# Patient Record
Sex: FEMALE | Race: WHITE | NOT HISPANIC OR LATINO | Employment: UNEMPLOYED | URBAN - METROPOLITAN AREA
[De-identification: names, ages, dates, MRNs, and addresses within clinical notes are randomized per-mention and may not be internally consistent; named-entity substitution may affect disease eponyms.]

---

## 2018-02-22 ENCOUNTER — OFFICE VISIT (OUTPATIENT)
Dept: URGENT CARE | Facility: PHYSICIAN GROUP | Age: 23
End: 2018-02-22
Payer: COMMERCIAL

## 2018-02-22 VITALS
SYSTOLIC BLOOD PRESSURE: 134 MMHG | DIASTOLIC BLOOD PRESSURE: 82 MMHG | BODY MASS INDEX: 30.31 KG/M2 | HEART RATE: 122 BPM | RESPIRATION RATE: 15 BRPM | HEIGHT: 68 IN | WEIGHT: 200 LBS | TEMPERATURE: 97.6 F | OXYGEN SATURATION: 95 %

## 2018-02-22 DIAGNOSIS — J01.00 ACUTE NON-RECURRENT MAXILLARY SINUSITIS: ICD-10-CM

## 2018-02-22 PROCEDURE — 99204 OFFICE O/P NEW MOD 45 MIN: CPT | Performed by: FAMILY MEDICINE

## 2018-02-22 RX ORDER — AMOXICILLIN AND CLAVULANATE POTASSIUM 875; 125 MG/1; MG/1
1 TABLET, FILM COATED ORAL 2 TIMES DAILY
Qty: 14 TAB | Refills: 0 | Status: SHIPPED | OUTPATIENT
Start: 2018-02-22 | End: 2018-03-01

## 2018-02-22 RX ORDER — GUAIFENESIN 600 MG/1
600 TABLET, EXTENDED RELEASE ORAL EVERY 12 HOURS
COMMUNITY
End: 2018-05-13

## 2018-02-22 ASSESSMENT — ENCOUNTER SYMPTOMS
CHILLS: 0
SINUS PRESSURE: 1
SORE THROAT: 0
VOMITING: 0
MYALGIAS: 0
NECK PAIN: 0
FEVER: 0
EYE PAIN: 0
SWOLLEN GLANDS: 0
NAUSEA: 0
DIZZINESS: 0
HEADACHES: 1
SHORTNESS OF BREATH: 0

## 2018-02-22 NOTE — PATIENT INSTRUCTIONS

## 2018-02-23 NOTE — PROGRESS NOTES
"  Subjective:     Fozia Nichole is a 22 y.o. female who presents for Nasal Congestion (x 4 weeks)       Sinusitis   This is a new problem. The current episode started 1 to 4 weeks ago. The problem has been gradually worsening since onset. There has been no fever. The pain is severe. Associated symptoms include congestion, headaches and sinus pressure. Pertinent negatives include no chills, neck pain, shortness of breath, sore throat or swollen glands.     Past Medical History:   Diagnosis Date   • Migraine headache    • Ulcer (CMS-HCC)      Past Surgical History:   Procedure Laterality Date   • TYMPANOPLASTY       Social History     Social History   • Marital status: Single     Spouse name: N/A   • Number of children: N/A   • Years of education: N/A     Occupational History   • Not on file.     Social History Main Topics   • Smoking status: Never Smoker   • Smokeless tobacco: Never Used   • Alcohol use Yes      Comment: socially    • Drug use: No   • Sexual activity: Not on file     Other Topics Concern   • Not on file     Social History Narrative   • No narrative on file      Family History   Problem Relation Age of Onset   • Cancer Mother    • Cancer Maternal Grandmother    • Cancer Maternal Grandfather    • Heart Disease Maternal Grandfather    • Stroke Neg Hx     Review of Systems   Constitutional: Negative for chills and fever.   HENT: Positive for congestion and sinus pressure. Negative for sore throat.    Eyes: Negative for pain.   Respiratory: Negative for shortness of breath.    Cardiovascular: Negative for chest pain.   Gastrointestinal: Negative for nausea and vomiting.   Genitourinary: Negative for hematuria.   Musculoskeletal: Negative for myalgias and neck pain.   Skin: Negative for rash.   Neurological: Positive for headaches. Negative for dizziness.   No Known Allergies   Objective:   /82   Pulse (!) 122   Temp 36.4 °C (97.6 °F)   Resp 15   Ht 1.727 m (5' 8\")   Wt 90.7 kg (200 lb) "   SpO2 95%   Breastfeeding? No   BMI 30.41 kg/m²   Physical Exam   Constitutional: She is oriented to person, place, and time. She appears well-developed and well-nourished. No distress.   HENT:   Head: Normocephalic and atraumatic.   Nose: Mucosal edema and rhinorrhea present. Right sinus exhibits maxillary sinus tenderness. Left sinus exhibits maxillary sinus tenderness.   Eyes: Conjunctivae and EOM are normal. Pupils are equal, round, and reactive to light.   Cardiovascular: Normal rate, regular rhythm, normal heart sounds and intact distal pulses.    No murmur heard.  Pulmonary/Chest: Effort normal and breath sounds normal. No respiratory distress.   Abdominal: Soft. Bowel sounds are normal. She exhibits no distension. There is no tenderness.   Musculoskeletal: Normal range of motion.   Neurological: She is alert and oriented to person, place, and time. She has normal reflexes. No sensory deficit.   Skin: Skin is warm and dry.   Psychiatric: She has a normal mood and affect. Her behavior is normal.         Assessment/Plan:   Assessment    1. Acute non-recurrent maxillary sinusitis  - amoxicillin-clavulanate (AUGMENTIN) 875-125 MG Tab; Take 1 Tab by mouth 2 times a day for 7 days.  Dispense: 14 Tab; Refill: 0  Differential diagnosis, natural history, supportive care, and indications for immediate follow-up discussed.

## 2018-04-10 ENCOUNTER — OFFICE VISIT (OUTPATIENT)
Dept: URGENT CARE | Facility: PHYSICIAN GROUP | Age: 23
End: 2018-04-10
Payer: COMMERCIAL

## 2018-04-10 VITALS
TEMPERATURE: 97.1 F | DIASTOLIC BLOOD PRESSURE: 78 MMHG | OXYGEN SATURATION: 96 % | HEIGHT: 68 IN | HEART RATE: 87 BPM | WEIGHT: 200 LBS | BODY MASS INDEX: 30.31 KG/M2 | SYSTOLIC BLOOD PRESSURE: 110 MMHG

## 2018-04-10 DIAGNOSIS — J06.9 UPPER RESPIRATORY TRACT INFECTION, UNSPECIFIED TYPE: ICD-10-CM

## 2018-04-10 DIAGNOSIS — H66.001 ACUTE SUPPURATIVE OTITIS MEDIA OF RIGHT EAR WITHOUT SPONTANEOUS RUPTURE OF TYMPANIC MEMBRANE, RECURRENCE NOT SPECIFIED: ICD-10-CM

## 2018-04-10 PROCEDURE — 99214 OFFICE O/P EST MOD 30 MIN: CPT | Performed by: PHYSICIAN ASSISTANT

## 2018-04-10 RX ORDER — AMOXICILLIN 875 MG/1
875 TABLET, COATED ORAL 2 TIMES DAILY
Qty: 14 TAB | Refills: 0 | Status: SHIPPED | OUTPATIENT
Start: 2018-04-10 | End: 2018-04-17

## 2018-04-10 ASSESSMENT — ENCOUNTER SYMPTOMS
VOMITING: 0
ABDOMINAL PAIN: 0
DIZZINESS: 0
FEVER: 0
SPUTUM PRODUCTION: 1
CHILLS: 0
EYE DISCHARGE: 0
NECK PAIN: 0
DIARRHEA: 0
SHORTNESS OF BREATH: 0
MYALGIAS: 0
SWOLLEN GLANDS: 0
HEADACHES: 0
SORE THROAT: 1
WHEEZING: 1
TINGLING: 0
RHINORRHEA: 1
EYE REDNESS: 0
COUGH: 1

## 2018-04-10 NOTE — PROGRESS NOTES
"Subjective:      Fozia Nichole is a 22 y.o. female who presents with Otalgia (x2days R ear pain)            Patient is a 22-year-old female who presents with right ear pain, congestion, pressure for the last 2-3 days. Patient reports prior history of T and reconstruction after perforation. She also reports associated postnasal drainage, slight cough with intermittent wheezing. Patient does report prior history of asthma which she is needed to use her rescue inhaler the last few days with significant improvement of wheezing and chest tightness. She denies any fevers, chills, shortness of breath or chest pain.      URI    This is a new problem. The current episode started in the past 7 days. The problem has been waxing and waning. There has been no fever. Associated symptoms include congestion, coughing, ear pain, a plugged ear sensation, rhinorrhea, a sore throat and wheezing. Pertinent negatives include no abdominal pain, chest pain, diarrhea, dysuria, headaches, neck pain, rash, swollen glands or vomiting. Treatments tried: albuterol, antihistamine. The treatment provided moderate relief.       Review of Systems   Constitutional: Negative for chills, fever and malaise/fatigue.   HENT: Positive for congestion, ear pain, rhinorrhea and sore throat. Negative for ear discharge.    Eyes: Negative for discharge and redness.   Respiratory: Positive for cough, sputum production and wheezing. Negative for shortness of breath.    Cardiovascular: Negative for chest pain and leg swelling.   Gastrointestinal: Negative for abdominal pain, diarrhea and vomiting.   Genitourinary: Negative for dysuria and urgency.   Musculoskeletal: Negative for myalgias and neck pain.   Skin: Negative for itching and rash.   Neurological: Negative for dizziness, tingling and headaches.          Objective:     /78   Pulse 87   Temp 36.2 °C (97.1 °F)   Ht 1.727 m (5' 8\")   Wt 90.7 kg (200 lb)   SpO2 96%   BMI 30.41 kg/m²    PMH: "  has a past medical history of Migraine headache and Ulcer (CMS-HCC). She also has no past medical history of Asthma; Cancer (CMS-HCC); Diabetes (CMS-HCC); Hyperlipidemia; or Hypertension.  MEDS:   Current Outpatient Prescriptions:   •  amoxicillin (AMOXIL) 875 MG tablet, Take 1 Tab by mouth 2 times a day for 7 days., Disp: 14 Tab, Rfl: 0  •  Zinc Sulfate (ZINC-220 PO), Take  by mouth., Disp: , Rfl:   •  guaiFENesin LA (MUCINEX) 600 MG TABLET SR 12 HR, Take 600 mg by mouth every 12 hours., Disp: , Rfl:   •  Norgestim-Eth Estrad Triphasic 0.18/0.215/0.25 MG-35 MCG TABS, TAKE 1 TABLET BY MOUTH EVERY DAY, Disp: 84 Tab, Rfl: 3  •  Diclofenac Potassium (CAMBIA) 50 MG PACK, Take 50 mg by mouth as needed. Take one packet and mix with 3oz of water.  Drink on empty stomach at onset of headache. Do not exceed more than one dose in 24 hours., Disp: 9 Each, Rfl: 2  •  topiramate (TOPAMAX) 100 MG TABS, Take 100mg po qhs., Disp: 30 Tab, Rfl: 5  •  amoxicillin-clavulanate (AUGMENTIN) 875-125 MG TABS, TAKE 1 TABLET BY MOUTH TWICE A DAY, Disp: , Rfl: 0  •  CIPRODEX 0.3-0.1 % SUSP, , Disp: , Rfl: 0  •  fluocinonide (LIDEX) 0.05 % OINT, , Disp: , Rfl: 0  •  fluticasone (FLONASE) 50 MCG/ACT nasal spray, INSTILL 2 SPRAYS INTO EACH NOSTRIL ONCE DAILY, Disp: , Rfl: 1  •  montelukast (SINGULAIR) 10 MG TABS, , Disp: , Rfl: 2  •  topiramate (TOPAMAX) 25 MG TABS, Take 100mg po qhs.  Titrate per separate written instructions., Disp: 120 Tab, Rfl: 1  •  ondansetron (ZOFRAN) 8 MG TABS, Take 1 Tab by mouth every 8 hours as needed for Nausea/Vomiting., Disp: 15 Tab, Rfl: 1  •  rizatriptan (MAXALT-MLT) 10 MG disintegrating tablet, Take 1/2-1 tablet po at onset of headache, may repeat dose in 2 hours if unrelieved.  Do not exceed more than 2 tablets in 24 hours., Disp: 9 Tab, Rfl: 2  •  Topiramate ER (TROKENDI XR) 100 MG CP24, Take 100 mg by mouth every bedtime., Disp: 30 Cap, Rfl: 2  •  Topiramate ER (QUDEXY XR) 100 MG CS24, Take 100 mg by mouth  every bedtime. Generic, Disp: 30 Each, Rfl: 2  •  Topiramate  MG CP24, Take 100 mg by mouth every bedtime., Disp: 30 Cap, Rfl: 2  •  zolmitriptan (ZOMIG) 5 MG tablet, Take 1/2-1 tablet po at onset of headache, may repeat dose in 2 hours if unrelieved.  Do not exceed more than 2 tablets in 24 hours., Disp: 18 Tab, Rfl: 1  •  topiramate (TOPOMAX) 15 MG CPSP, Take four capsules po qhs.  Titrate per separate written instructions., Disp: 120 Cap, Rfl: 2  •  zolmitriptan (ZOMIG-ZMT) 2.5 MG disintegrating tablet, Take 1/2-1 tablet po at onset of headache, may repeat dose in 2 hours if unrelieved.  Do not exceed more than 2 tablets in 24 hours., Disp: 9 Tab, Rfl: 3  •  ascorbic acid (ASCORBIC ACID) 500 MG TABS, Take 500 mg by mouth every day., Disp: , Rfl:   •  sumatriptan (IMITREX) 100 MG tablet, Take 1/2-1 tablet po at onset of headache, may repeat dose in 2 hours if unrelieved.  Do not exceed more than 2 tablets in 24 hours., Disp: 9 Tab, Rfl: 5  •  Frovatriptan Succinate 2.5 MG TABS, Take 1/2-1 tablet po at onset of headache, may repeat dose in 2 hours if unrelieved.  Do not exceed more than 2 tablets in 24 hours., Disp: 9 Each, Rfl: 5  •  Non Formulary Request, Birth control pill one tablet daily, Disp: , Rfl:   ALLERGIES: No Known Allergies  SURGHX:   Past Surgical History:   Procedure Laterality Date   • TYMPANOPLASTY       SOCHX:  reports that she has never smoked. She has never used smokeless tobacco. She reports that she drinks alcohol. She reports that she does not use drugs.  FH: Family history was reviewed, no pertinent findings to report    Physical Exam   Constitutional: She is oriented to person, place, and time. She appears well-developed and well-nourished.   HENT:   Head: Normocephalic and atraumatic.   Mouth/Throat: No oropharyngeal exudate.   Ears- Canals clear-Right TM with erythema, bulging middle ear effusion- with purulent discharge- a postsurgical changes to the TM noted. Pos. PND, with  slight erythema- without tonsillar edema or exudate.   Mild discharge noted bilaterally- to nares.      Eyes: EOM are normal. Pupils are equal, round, and reactive to light.   Neck: Normal range of motion. Neck supple.   Cardiovascular: Normal rate and regular rhythm.    No murmur heard.  Pulmonary/Chest: Effort normal and breath sounds normal. No respiratory distress.   Musculoskeletal: Normal range of motion. She exhibits no tenderness.   Lymphadenopathy:     She has no cervical adenopathy.   Neurological: She is alert and oriented to person, place, and time.   Skin: Skin is warm. No rash noted.   Psychiatric: She has a normal mood and affect. Her behavior is normal.   Vitals reviewed.              Assessment/Plan:     1. Acute suppurative otitis media of right ear without spontaneous rupture of tympanic membrane, recurrence not specified  - amoxicillin (AMOXIL) 875 MG tablet; Take 1 Tab by mouth 2 times a day for 7 days.  Dispense: 14 Tab; Refill: 0    2. Upper respiratory tract infection, unspecified type     Encouraged OTC supportive meds PRN. Humidification, increase fluids, avoid night time dairy. No wheezing or adventitious breath sounds noted on exam-patient encouraged to utilize albuterol if needed.  Patient given precautionary s/sx that mandate immediate follow up and evaluation in the ED. Advised of risks of not doing so.    DDX, Supportive care, and indications for immediate follow-up discussed with patient.    Instructed to return to clinic or nearest emergency department if we are not available for any change in condition, further concerns, or worsening of symptoms.    The patient demonstrated a good understanding and agreed with the treatment plan.

## 2018-05-13 ENCOUNTER — OFFICE VISIT (OUTPATIENT)
Dept: URGENT CARE | Facility: PHYSICIAN GROUP | Age: 23
End: 2018-05-13
Payer: COMMERCIAL

## 2018-05-13 ENCOUNTER — HOSPITAL ENCOUNTER (OUTPATIENT)
Facility: MEDICAL CENTER | Age: 23
End: 2018-05-13
Attending: NURSE PRACTITIONER
Payer: COMMERCIAL

## 2018-05-13 VITALS
HEART RATE: 118 BPM | HEIGHT: 68 IN | DIASTOLIC BLOOD PRESSURE: 86 MMHG | SYSTOLIC BLOOD PRESSURE: 128 MMHG | RESPIRATION RATE: 14 BRPM | TEMPERATURE: 96.9 F | OXYGEN SATURATION: 96 % | WEIGHT: 207 LBS | BODY MASS INDEX: 31.37 KG/M2

## 2018-05-13 DIAGNOSIS — N94.9 FEMALE GENITAL LESION: ICD-10-CM

## 2018-05-13 DIAGNOSIS — N89.8 VAGINAL ITCHING: ICD-10-CM

## 2018-05-13 LAB
APPEARANCE UR: NORMAL
BILIRUB UR STRIP-MCNC: NORMAL MG/DL
COLOR UR AUTO: YELLOW
GLUCOSE UR STRIP.AUTO-MCNC: NORMAL MG/DL
INT CON NEG: NEGATIVE
INT CON POS: POSITIVE
KETONES UR STRIP.AUTO-MCNC: NORMAL MG/DL
LEUKOCYTE ESTERASE UR QL STRIP.AUTO: NORMAL
NITRITE UR QL STRIP.AUTO: NORMAL
PH UR STRIP.AUTO: 6 [PH] (ref 5–8)
POC URINE PREGNANCY TEST: NORMAL
PROT UR QL STRIP: NORMAL MG/DL
RBC UR QL AUTO: NORMAL
SP GR UR STRIP.AUTO: 1.02
UROBILINOGEN UR STRIP-MCNC: NORMAL MG/DL

## 2018-05-13 PROCEDURE — 81002 URINALYSIS NONAUTO W/O SCOPE: CPT | Performed by: NURSE PRACTITIONER

## 2018-05-13 PROCEDURE — 81025 URINE PREGNANCY TEST: CPT | Performed by: NURSE PRACTITIONER

## 2018-05-13 PROCEDURE — 87529 HSV DNA AMP PROBE: CPT

## 2018-05-13 PROCEDURE — 99214 OFFICE O/P EST MOD 30 MIN: CPT | Performed by: NURSE PRACTITIONER

## 2018-05-13 RX ORDER — FLUCONAZOLE 150 MG/1
150 TABLET ORAL DAILY
Qty: 1 TAB | Refills: 0 | Status: SHIPPED | OUTPATIENT
Start: 2018-05-13 | End: 2019-01-22

## 2018-05-13 RX ORDER — ACYCLOVIR 400 MG/1
400 TABLET ORAL 3 TIMES DAILY
Qty: 21 TAB | Refills: 0 | Status: SHIPPED | OUTPATIENT
Start: 2018-05-13 | End: 2018-05-20

## 2018-05-13 ASSESSMENT — ENCOUNTER SYMPTOMS
GASTROINTESTINAL NEGATIVE: 1
NEUROLOGICAL NEGATIVE: 1
CONSTITUTIONAL NEGATIVE: 1
MUSCULOSKELETAL NEGATIVE: 1
NAUSEA: 0
PSYCHIATRIC NEGATIVE: 1
FLANK PAIN: 0
CHILLS: 0
RESPIRATORY NEGATIVE: 1
FEVER: 0
CARDIOVASCULAR NEGATIVE: 1
VOMITING: 0
WEAKNESS: 0
ABDOMINAL PAIN: 0

## 2018-05-13 NOTE — PROGRESS NOTES
Subjective:      Fozia Nichole is a 22 y.o. female who presents with Dysuria (x1wk, frequency, painful)        HPI    The patient presents to urgent care today with complaints of vaginal pain, discharge, dysuria, vaginal lesions. States her symptoms started one week ago with dysuria. She subsequently started developing itching 4 days later with an increase in vaginal discharge. States that those symptoms are similar and consistent with history of yeast infections. She used an over-the-counter yeast infection medication with some improvement of her symptoms. She became concerned when she started noticing lesions to her right labia yesterday. In addition, she reports that she is having diarrhea today and unfortunately cannot give us a clean urine sample without diarrhea and her urine sample. She has a history of urinary tract infections but states that this does not feel like one at all. She denies associated fever, chills, urinary urgency, urinary frequency, hematuria, flank pain, abdominal pain, nausea, vomiting. She is sexually active, is monogamous with her wife. The wife admits to a history of oral herpes with a recent feeling of potential outbreak but denies developing lesions. The patient denies known history of STDs.    Past Medical History:   Diagnosis Date   • Migraine headache    • Ulcer         Past Surgical History:   Procedure Laterality Date   • TYMPANOPLASTY       Current Outpatient Prescriptions on File Prior to Visit   Medication Sig Dispense Refill   • Topiramate ER (TROKENDI XR) 100 MG CP24 Take 100 mg by mouth every bedtime. 30 Cap 2   • Topiramate ER (QUDEXY XR) 100 MG CS24 Take 100 mg by mouth every bedtime. Generic 30 Each 2   • Topiramate  MG CP24 Take 100 mg by mouth every bedtime. 30 Cap 2   • zolmitriptan (ZOMIG) 5 MG tablet Take 1/2-1 tablet po at onset of headache, may repeat dose in 2 hours if unrelieved.  Do not exceed more than 2 tablets in 24 hours. 18 Tab 1   •  "zolmitriptan (ZOMIG-ZMT) 2.5 MG disintegrating tablet Take 1/2-1 tablet po at onset of headache, may repeat dose in 2 hours if unrelieved.  Do not exceed more than 2 tablets in 24 hours. 9 Tab 3   • sumatriptan (IMITREX) 100 MG tablet Take 1/2-1 tablet po at onset of headache, may repeat dose in 2 hours if unrelieved.  Do not exceed more than 2 tablets in 24 hours. 9 Tab 5   • Frovatriptan Succinate 2.5 MG TABS Take 1/2-1 tablet po at onset of headache, may repeat dose in 2 hours if unrelieved.  Do not exceed more than 2 tablets in 24 hours. 9 Each 5     No current facility-administered medications on file prior to visit.      Patient has no known allergies.    Review of Systems   Constitutional: Negative.  Negative for chills, fever and malaise/fatigue.   HENT: Negative.    Respiratory: Negative.    Cardiovascular: Negative.    Gastrointestinal: Negative.  Negative for abdominal pain, nausea and vomiting.   Genitourinary: Positive for dysuria. Negative for flank pain, frequency, hematuria and urgency.   Musculoskeletal: Negative.    Skin: Positive for itching and rash.        Painful lesions to right labia   Neurological: Negative.  Negative for weakness.   Endo/Heme/Allergies: Negative.    Psychiatric/Behavioral: Negative.           Objective:     /86   Pulse (!) 118   Temp 36.1 °C (96.9 °F)   Resp 14   Ht 1.727 m (5' 8\")   Wt 93.9 kg (207 lb)   LMP 03/13/2018   SpO2 96%   BMI 31.47 kg/m²      Physical Exam   Constitutional: She is oriented to person, place, and time. Vital signs are normal. She appears well-developed and well-nourished. She is active. She does not have a sickly appearance. She does not appear ill. No distress.   HENT:   Head: Normocephalic and atraumatic.   Right Ear: External ear normal.   Left Ear: External ear normal.   Nose: Nose normal.   Mouth/Throat: Oropharynx is clear and moist. No oropharyngeal exudate.   Eyes: Conjunctivae are normal. Pupils are equal, round, and reactive " to light. Right eye exhibits no discharge. Left eye exhibits no discharge. No scleral icterus.   Neck: Normal range of motion. Neck supple. No JVD present.   Cardiovascular: Normal rate, regular rhythm, normal heart sounds and intact distal pulses.    Normal rate auscultated at 95.   Pulmonary/Chest: Effort normal and breath sounds normal. No stridor. No respiratory distress. She has no wheezes.   Abdominal: Soft. Normal appearance and bowel sounds are normal. She exhibits no distension. There is no tenderness. There is no rebound and no guarding.   Genitourinary:       Pelvic exam was performed with patient supine. There is rash, tenderness and lesion on the right labia. There is no injury on the right labia.   Musculoskeletal: Normal range of motion. She exhibits no edema, tenderness or deformity.   Lymphadenopathy:     She has no cervical adenopathy.   Neurological: She is alert and oriented to person, place, and time. She has normal strength. No cranial nerve deficit or sensory deficit. She exhibits normal muscle tone. Coordination and gait normal. GCS eye subscore is 4. GCS verbal subscore is 5. GCS motor subscore is 6.   Skin: Skin is warm. Capillary refill takes less than 2 seconds. Rash noted. No abrasion, no bruising, no ecchymosis and no laceration noted. Rash is vesicular. She is not diaphoretic. No pallor.   Psychiatric: She has a normal mood and affect. Her behavior is normal. Judgment and thought content normal.   Vitals reviewed.              Assessment/Plan:     1. Vaginal itching  POCT Urinalysis    POCT Pregnancy    fluconazole (DIFLUCAN) 150 MG tablet   2. Female genital lesion  HERPES SCREEN VIRAL CULTURE    acyclovir (ZOVIRAX) 400 MG tablet           Lab Results   Component Value Date/Time    POCCOLOR yellow 05/13/2018 03:32 PM    POCAPPEAR cloudy 05/13/2018 03:32 PM    POCLEUKEST LG 05/13/2018 03:32 PM    POCNITRITE neg 05/13/2018 03:32 PM    POCUROBILIGE neg 05/13/2018 03:32 PM    POCPROTEIN  neg 05/13/2018 03:32 PM    POCURPH 6.0 05/13/2018 03:32 PM    POCBLOOD SM 05/13/2018 03:32 PM    POCSPGRV 1.020 05/13/2018 03:32 PM    POCKETONES neg 05/13/2018 03:32 PM    POCBILIRUBIN neg 05/13/2018 03:32 PM    POCGLUCUA neg 05/13/2018 03:32 PM      POC preg negative      Patient is a well-appearing 22-year-old female with complaints of vaginal pain, dysuria, itching, and lesions. Her urine dip today shows large leuks in small blood. She reports that her urine sample was provided with scant amount of diarrhea and her urine, therefore I do not feel the test is solid. She denies urinary tract infection symptoms do not suspect this has urinary etiology. Her pregnancy is negative. Due to her symptoms and improvement with over-the-counter antifungal, will provide one dose of Diflucan. In addition, a herpes screen was collected and will be sent for testing. She will be given acyclovir for suspected herpetic lesions, will call patient with results. Safe sex practices discussed. Supportive care, differential diagnoses, and indications for immediate follow-up discussed with patient.   Pathogenesis of diagnosis discussed including typical length and natural progression.   Instructed to return to clinic or nearest emergency department for any change in condition, further concerns, or worsening of symptoms.  Patient states understanding of the plan of care and discharge instructions.  Instructed to make an appointment, for follow up, with her primary care provider.        BLANCA West.

## 2018-05-14 LAB
HSV1+2 DNA SPEC QL NAA+PROBE: ABNORMAL
SIGNIFICANT IND 70042: ABNORMAL
SITE SITE: ABNORMAL
SOURCE SOURCE: ABNORMAL

## 2018-05-15 ENCOUNTER — TELEPHONE (OUTPATIENT)
Dept: URGENT CARE | Facility: PHYSICIAN GROUP | Age: 23
End: 2018-05-15

## 2018-05-15 NOTE — TELEPHONE ENCOUNTER
The patient was called for re-evaluation, HSV positive for type I, a message was left to call back to discuss, instructed to continue antivirals, encouraged to call back to the clinic or return to clinic with any questions or concerns.

## 2018-05-20 ENCOUNTER — OFFICE VISIT (OUTPATIENT)
Dept: URGENT CARE | Facility: PHYSICIAN GROUP | Age: 23
End: 2018-05-20
Payer: COMMERCIAL

## 2018-05-20 ENCOUNTER — HOSPITAL ENCOUNTER (OUTPATIENT)
Facility: MEDICAL CENTER | Age: 23
End: 2018-05-20
Attending: NURSE PRACTITIONER
Payer: COMMERCIAL

## 2018-05-20 VITALS
SYSTOLIC BLOOD PRESSURE: 126 MMHG | DIASTOLIC BLOOD PRESSURE: 82 MMHG | HEIGHT: 68 IN | OXYGEN SATURATION: 97 % | BODY MASS INDEX: 31.37 KG/M2 | WEIGHT: 207 LBS | RESPIRATION RATE: 16 BRPM | HEART RATE: 94 BPM | TEMPERATURE: 98.7 F

## 2018-05-20 DIAGNOSIS — N30.01 ACUTE CYSTITIS WITH HEMATURIA: ICD-10-CM

## 2018-05-20 DIAGNOSIS — R30.0 DYSURIA: ICD-10-CM

## 2018-05-20 LAB
APPEARANCE UR: NORMAL
BILIRUB UR STRIP-MCNC: NORMAL MG/DL
COLOR UR AUTO: YELLOW
GLUCOSE UR STRIP.AUTO-MCNC: NORMAL MG/DL
KETONES UR STRIP.AUTO-MCNC: NORMAL MG/DL
LEUKOCYTE ESTERASE UR QL STRIP.AUTO: NORMAL
NITRITE UR QL STRIP.AUTO: NORMAL
PH UR STRIP.AUTO: 6.5 [PH] (ref 5–8)
PROT UR QL STRIP: NORMAL MG/DL
RBC UR QL AUTO: NORMAL
SP GR UR STRIP.AUTO: 1.02
UROBILINOGEN UR STRIP-MCNC: NORMAL MG/DL

## 2018-05-20 PROCEDURE — 99214 OFFICE O/P EST MOD 30 MIN: CPT | Performed by: NURSE PRACTITIONER

## 2018-05-20 PROCEDURE — 81002 URINALYSIS NONAUTO W/O SCOPE: CPT | Performed by: NURSE PRACTITIONER

## 2018-05-20 PROCEDURE — 87086 URINE CULTURE/COLONY COUNT: CPT

## 2018-05-20 RX ORDER — NITROFURANTOIN 25; 75 MG/1; MG/1
100 CAPSULE ORAL EVERY 12 HOURS
Qty: 10 CAP | Refills: 0 | Status: SHIPPED | OUTPATIENT
Start: 2018-05-20 | End: 2018-05-25

## 2018-05-20 NOTE — PROGRESS NOTES
Chief Complaint   Patient presents with   • Dysuria     onset friday, urgency, frequency       HISTORY OF PRESENT ILLNESS: Patient is a 23 y.o. female who presents today due to three days of urinary burning, urgency, and frequency. Reports some associated pelvic pressure. Denies hematuria, fever, flank pain, N/V. Denies a history of pyelonephritis or kidney stones. Admits to a history of frequent UTIs, last one was in November. She was recently seen for genital lesion and discharge, diagnosed with HSV and was prescribed antivirals. She reports significant improvement and denies any GYN complaints at this time.     Patient Active Problem List    Diagnosis Date Noted   • Migraine without aura 01/21/2014       Allergies:Patient has no known allergies.    Current Outpatient Prescriptions Ordered in [facility]   Medication Sig Dispense Refill   • nitrofurantoin monohydr macro (MACROBID) 100 MG Cap Take 1 Cap by mouth every 12 hours for 5 days. 10 Cap 0   • acyclovir (ZOVIRAX) 400 MG tablet Take 1 Tab by mouth 3 times a day for 7 days. 21 Tab 0   • fluconazole (DIFLUCAN) 150 MG tablet Take 1 Tab by mouth every day. 1 Tab 0   • Topiramate ER (TROKENDI XR) 100 MG CP24 Take 100 mg by mouth every bedtime. 30 Cap 2   • Topiramate ER (QUDEXY XR) 100 MG CS24 Take 100 mg by mouth every bedtime. Generic 30 Each 2   • Topiramate  MG CP24 Take 100 mg by mouth every bedtime. 30 Cap 2   • zolmitriptan (ZOMIG) 5 MG tablet Take 1/2-1 tablet po at onset of headache, may repeat dose in 2 hours if unrelieved.  Do not exceed more than 2 tablets in 24 hours. 18 Tab 1   • zolmitriptan (ZOMIG-ZMT) 2.5 MG disintegrating tablet Take 1/2-1 tablet po at onset of headache, may repeat dose in 2 hours if unrelieved.  Do not exceed more than 2 tablets in 24 hours. 9 Tab 3   • sumatriptan (IMITREX) 100 MG tablet Take 1/2-1 tablet po at onset of headache, may repeat dose in 2 hours if unrelieved.  Do not exceed more than 2 tablets in 24 hours. 9 Tab 5  "  • Frovatriptan Succinate 2.5 MG TABS Take 1/2-1 tablet po at onset of headache, may repeat dose in 2 hours if unrelieved.  Do not exceed more than 2 tablets in 24 hours. 9 Each 5     No current Epic-ordered facility-administered medications on file.        Past Medical History:   Diagnosis Date   • Migraine headache    • Ulcer        Social History   Substance Use Topics   • Smoking status: Never Smoker   • Smokeless tobacco: Never Used   • Alcohol use Yes      Comment: socially        Family Status   Relation Status   • Mother    • Maternal Grandmother    • Maternal Grandfather    • Neg Hx      Family History   Problem Relation Age of Onset   • Cancer Mother    • Cancer Maternal Grandmother    • Cancer Maternal Grandfather    • Heart Disease Maternal Grandfather    • Stroke Neg Hx        ROS:  Review of Systems   Constitutional: Negative for fever, chills, weight loss and malaise/fatigue.   HENT: Negative for ear pain, nosebleeds, congestion, sore throat and neck pain.    Eyes: Negative for vision changes.   Neuro: Negative for headache, sensory changes, weakness, seizure, LOC.   Cardiovascular: Negative for chest pain, palpitations, orthopnea and leg swelling.   Respiratory: Negative for cough, sputum production, shortness of breath and wheezing.   Gastrointestinal: Positive for lower abdominal pressure. Negative for abdominal pain, nausea, vomiting or diarrhea.   Genitourinary: Positive for dysuria, urgency and frequency. Negative for hematuria or flank pain.   Skin: Negative for rash, diaphoresis.     Exam:  Blood pressure 126/82, pulse 94, temperature 37.1 °C (98.7 °F), resp. rate 16, height 1.727 m (5' 8\"), weight 93.9 kg (207 lb), SpO2 97 %.  General: well-nourished, well-developed female in NAD  Head: normocephalic, atraumatic  Eyes: PERRLA, no conjunctival injection, acuity grossly intact, lids normal.  Lymph: no cervical adenopathy. No supraclavicular adenopathy.   Neuro: alert and oriented. Cranial " nerves 1-12 grossly intact. No sensory deficit.   Cardiovascular: regular rate and rhythm. No edema.  Pulmonary: no distress. Chest is symmetrical with respiration, no wheezes, crackles, or rhonchi.   Abdomen: soft, non-tender, no guarding, no hepatosplenomegaly. No CVA tenderness.   Musculoskeletal: no clubbing, appropriate muscle tone, gait is stable.  Skin: warm, dry, intact, no clubbing, no cyanosis, no rashes.   Psych: appropriate mood, affect, judgement.         Assessment/Plan:  1. Acute cystitis with hematuria  Urine Culture    nitrofurantoin monohydr macro (MACROBID) 100 MG Cap   2. Dysuria  POCT Urinalysis    Urine Culture    nitrofurantoin monohydr macro (MACROBID) 100 MG Cap    CANCELED: POCT Pregnancy           Antibiotic as directed. Increase fluid intake. Urine sent for culture.   Supportive care, differential diagnoses, and indications for immediate follow-up discussed with patient.   Pathogenesis of diagnosis discussed including typical length and natural progression.   Instructed to return to clinic or nearest emergency department for any change in condition, further concerns, or worsening of symptoms.  Patient states understanding of the plan of care and discharge instructions.  Instructed to make an appointment, for follow up, with their primary care provider.        Please note that this dictation was created using voice recognition software. I have made every reasonable attempt to correct obvious errors, but I expect that there are errors of grammar and possibly content that I did not discover before finalizing the note.      BLANCA [place].

## 2018-05-22 ENCOUNTER — TELEPHONE (OUTPATIENT)
Dept: URGENT CARE | Facility: PHYSICIAN GROUP | Age: 23
End: 2018-05-22

## 2018-05-22 DIAGNOSIS — B96.89 BV (BACTERIAL VAGINOSIS): ICD-10-CM

## 2018-05-22 DIAGNOSIS — N76.0 BV (BACTERIAL VAGINOSIS): ICD-10-CM

## 2018-05-22 LAB
BACTERIA UR CULT: ABNORMAL
BACTERIA UR CULT: ABNORMAL
SIGNIFICANT IND 70042: ABNORMAL
SITE SITE: ABNORMAL
SOURCE SOURCE: ABNORMAL

## 2018-05-22 RX ORDER — METRONIDAZOLE 500 MG/1
500 TABLET ORAL 2 TIMES DAILY
Qty: 14 TAB | Refills: 0 | Status: SHIPPED | OUTPATIENT
Start: 2018-05-22 | End: 2018-05-29

## 2018-05-22 NOTE — TELEPHONE ENCOUNTER
The patient was called for re-evaluation, urine culture positive for gardnerella, suspect vaginal etiology. Instructed to DC macrobid and start Flagyl instead. Refrain from sexual activity until her wife is treated to prevent reoccurrence. Encouraged to call back to the clinic or return to clinic with any questions or concerns.

## 2018-08-14 ENCOUNTER — GYNECOLOGY VISIT (OUTPATIENT)
Dept: OBGYN | Facility: MEDICAL CENTER | Age: 23
End: 2018-08-14
Payer: COMMERCIAL

## 2018-08-14 ENCOUNTER — HOSPITAL ENCOUNTER (OUTPATIENT)
Facility: MEDICAL CENTER | Age: 23
End: 2018-08-14
Attending: OBSTETRICS & GYNECOLOGY
Payer: COMMERCIAL

## 2018-08-14 VITALS — DIASTOLIC BLOOD PRESSURE: 72 MMHG | BODY MASS INDEX: 31.63 KG/M2 | SYSTOLIC BLOOD PRESSURE: 112 MMHG | WEIGHT: 208 LBS

## 2018-08-14 DIAGNOSIS — Z01.419 WELL WOMAN EXAM: ICD-10-CM

## 2018-08-14 DIAGNOSIS — Z12.4 SCREENING FOR CERVICAL CANCER: ICD-10-CM

## 2018-08-14 DIAGNOSIS — Z11.51 SPECIAL SCREENING EXAMINATION FOR HUMAN PAPILLOMAVIRUS (HPV): ICD-10-CM

## 2018-08-14 PROCEDURE — 99395 PREV VISIT EST AGE 18-39: CPT | Performed by: OBSTETRICS & GYNECOLOGY

## 2018-08-14 PROCEDURE — 87591 N.GONORRHOEAE DNA AMP PROB: CPT

## 2018-08-14 PROCEDURE — 87491 CHLMYD TRACH DNA AMP PROBE: CPT

## 2018-08-14 PROCEDURE — 88175 CYTOPATH C/V AUTO FLUID REDO: CPT

## 2018-08-14 RX ORDER — NORGESTIMATE AND ETHINYL ESTRADIOL 7DAYSX3 LO
1 KIT ORAL DAILY
Qty: 28 TAB | Refills: 12 | Status: SHIPPED | OUTPATIENT
Start: 2018-08-14 | End: 2019-01-22

## 2018-08-14 NOTE — PROGRESS NOTES
Fozia Nichole is a 23 y.o. No obstetric history on file. female who presents for her Annual Gynecologic Exam      HPI Comments: Pt presents for her annual well woman exam.   Pt has no complaints. Currently in a same sex relationship.  Would like to restart OCPs to help with acne  Patient's last menstrual period was 07/02/2018.    Review of Systems:   Pertinent positives documented in HPI and all other systems reviewed & are negative    PGYN Hx: H/O HSV    All PMH, PSH, allergies, social history and FH reviewed and updated today:  Past Medical History:   Diagnosis Date   • Migraine headache    • Ulcer        Past Surgical History:   Procedure Laterality Date   • TYMPANOPLASTY         Medications:   Current Outpatient Prescriptions Ordered in [facility]   Medication Sig Dispense Refill   • Norgestim-Eth Estrad Triphasic (ORTHO TRI-CYCLEN LO) 0.18/0.215/0.25 MG-25 MCG Tab Take 1 Tab by mouth every day. 28 Tab 12   • fluconazole (DIFLUCAN) 150 MG tablet Take 1 Tab by mouth every day. 1 Tab 0   • Topiramate ER (TROKENDI XR) 100 MG CP24 Take 100 mg by mouth every bedtime. 30 Cap 2   • Topiramate ER (QUDEXY XR) 100 MG CS24 Take 100 mg by mouth every bedtime. Generic 30 Each 2   • Topiramate  MG CP24 Take 100 mg by mouth every bedtime. 30 Cap 2   • zolmitriptan (ZOMIG) 5 MG tablet Take 1/2-1 tablet po at onset of headache, may repeat dose in 2 hours if unrelieved.  Do not exceed more than 2 tablets in 24 hours. 18 Tab 1   • zolmitriptan (ZOMIG-ZMT) 2.5 MG disintegrating tablet Take 1/2-1 tablet po at onset of headache, may repeat dose in 2 hours if unrelieved.  Do not exceed more than 2 tablets in 24 hours. 9 Tab 3   • sumatriptan (IMITREX) 100 MG tablet Take 1/2-1 tablet po at onset of headache, may repeat dose in 2 hours if unrelieved.  Do not exceed more than 2 tablets in 24 hours. 9 Tab 5   • Frovatriptan Succinate 2.5 MG TABS Take 1/2-1 tablet po at onset of headache, may repeat dose in 2 hours if unrelieved.   Do not exceed more than 2 tablets in 24 hours. 9 Each 5     No current Epic-ordered facility-administered medications on file.        Patient has no known allergies.    Social History     Social History   • Marital status: Single     Spouse name: N/A   • Number of children: N/A   • Years of education: N/A     Social History Main Topics   • Smoking status: Never Smoker   • Smokeless tobacco: Never Used   • Alcohol use Yes      Comment: socially    • Drug use: No   • Sexual activity: Not on file     Other Topics Concern   • Not on file     Social History Narrative   • No narrative on file       Family History   Problem Relation Age of Onset   • Cancer Mother    • Cancer Maternal Grandmother    • Cancer Maternal Grandfather    • Heart Disease Maternal Grandfather    • Stroke Neg Hx           Objective:   Vital measurements:  Blood pressure 112/72, weight 94.3 kg (208 lb), last menstrual period 07/02/2018, not currently breastfeeding.  Body mass index is 31.63 kg/m². (Goal BM I>18 <25)    Physical Exam   Nursing note and vitals reviewed.  Constitutional: She is oriented to person, place, and time. She appears well-developed and well-nourished. No distress.     HEENT:   Head: Normocephalic and atraumatic.   Right Ear: External ear normal.   Left Ear: External ear normal.   Nose: Nose normal.   Eyes: Conjunctivae and EOM are normal. Pupils are equal, round, and reactive to light. No scleral icterus.     Neck: Normal range of motion. Neck supple. No tracheal deviation present. No thyromegaly present. No cervical or supraclavicular lymphadenopathy.    Pulmonary/Chest: Effort normal and breath sounds normal. No respiratory distress. She has no wheezes. She has no rales. She exhibits no tenderness.     Cardiovascular: Regular, rate and rhythm. No edema.    Breast: Symmetrical, normal consistency without masses., No dimpling or skin changes, Normal nipples without discharge, no axillary lymphadenopathy    Abdominal: Soft.  Bowel sounds are normal. She exhibits no distension and no mass. No tenderness. She has no rebound and no guarding.     Genitourinary:  Pelvic exam was performed with patient supine.  External genitalia with no abnormal pigmentation, labial fusion, rashes, tenderness, lesions or injury to the labia bilaterally.  BUS normal  Vagina is pink and moist with no lesions, foul discharge, erythema, tenderness or bleeding. No foreign body around the vagina or signs of injury.   Cervix exhibits no motion tenderness, no discharge and no friability, no lesions.   Uterus is 8 cm not deviated, not enlarged, not fixed and not tender.  Right adnexa displays no mass, no tenderness and no fullness.  Left adnexa displays no mass, no tenderness and no fullness.     Musculoskeletal: Normal range of motion. Non tender. She exhibits no edema and no tenderness.     Lymphadenopathy: She has no cervical or supraclavicular adenopathy.     Neurological: She is alert and oriented to person, place, and time. She exhibits normal muscle tone.     Skin: Skin is warm and dry. No rash noted. She is not diaphoretic. No erythema. No pallor.     Psychiatric: She has a normal mood and affect. Her behavior is normal. Judgment and thought content normal.        Assessment:     1. Well woman exam  THINPREP RFLX HPV ASCUS W/CTNG   2. Screening for cervical cancer  THINPREP RFLX HPV ASCUS W/CTNG   3. Special screening examination for human papillomavirus (HPV)  THINPREP RFLX HPV ASCUS W/CTNG         Plan:   Pap and physical exam performed  Self breast awareness discussed.  Encouraged exercise and proper diet.  Mammograms starting @ age 40 annually.  See medications and orders placed in encounter report.  Follow up in 1 year for annual exam or sooner as needed    Today I discussed with the patient risks, benefits, alternatives to birth control pills. We discussed estrogen progesterone-containing pills. I discussed normal use of pills, I.e. take the pill on a  daily basis, preferably at the same time each day. Also I discussed side effects of oral contraceptives  which can include nausea, vomiting, irregular bleeding, breast tenderness. We discussed risks associated with birth control pills mainly risk of blood clots in the legs that could travel to the lungs.  We discussed effectiveness of pills in preventing pregnancy to be between 95 and 98%.  Also discussed proper use of pills and what to do should the patient skip any pills. Discussed using backup form of birth control for the first month of use and with any missed pills.  Pt understands these risks and desires to take OCPs. Rx sent    Pt denies any aura with migraines    Also d/w pt Gardasil series, will decide and let us know

## 2018-08-15 DIAGNOSIS — Z01.419 WELL WOMAN EXAM: ICD-10-CM

## 2018-08-15 DIAGNOSIS — Z12.4 SCREENING FOR CERVICAL CANCER: ICD-10-CM

## 2018-08-15 DIAGNOSIS — Z11.51 SPECIAL SCREENING EXAMINATION FOR HUMAN PAPILLOMAVIRUS (HPV): ICD-10-CM

## 2018-08-16 LAB
C TRACH DNA GENITAL QL NAA+PROBE: NEGATIVE
CYTOLOGY REG CYTOL: NORMAL
N GONORRHOEA DNA GENITAL QL NAA+PROBE: NEGATIVE
SPECIMEN SOURCE: NORMAL

## 2018-12-21 ENCOUNTER — TELEPHONE (OUTPATIENT)
Dept: SCHEDULING | Facility: IMAGING CENTER | Age: 23
End: 2018-12-21

## 2019-01-22 ENCOUNTER — OFFICE VISIT (OUTPATIENT)
Dept: MEDICAL GROUP | Facility: PHYSICIAN GROUP | Age: 24
End: 2019-01-22
Payer: COMMERCIAL

## 2019-01-22 VITALS
WEIGHT: 201 LBS | OXYGEN SATURATION: 97 % | HEIGHT: 68 IN | HEART RATE: 107 BPM | RESPIRATION RATE: 16 BRPM | BODY MASS INDEX: 30.46 KG/M2 | SYSTOLIC BLOOD PRESSURE: 110 MMHG | DIASTOLIC BLOOD PRESSURE: 70 MMHG | TEMPERATURE: 97.8 F

## 2019-01-22 DIAGNOSIS — L70.0 ACNE VULGARIS: ICD-10-CM

## 2019-01-22 PROCEDURE — 99214 OFFICE O/P EST MOD 30 MIN: CPT | Performed by: PHYSICIAN ASSISTANT

## 2019-01-22 RX ORDER — NORGESTIMATE AND ETHINYL ESTRADIOL 7DAYSX3 LO
KIT ORAL
COMMUNITY
End: 2019-09-23 | Stop reason: SDUPTHER

## 2019-01-22 RX ORDER — CLINDAMYCIN PHOSPHATE 10 MG/G
GEL TOPICAL
Qty: 1 TUBE | Refills: 0 | Status: CANCELLED | OUTPATIENT
Start: 2019-01-22

## 2019-01-22 RX ORDER — CLINDAMYCIN PHOSPHATE 10 MG/G
GEL TOPICAL
Qty: 1 TUBE | Refills: 2 | Status: SHIPPED | OUTPATIENT
Start: 2019-01-22 | End: 2019-07-12

## 2019-01-22 RX ORDER — FLUTICASONE PROPIONATE 50 MCG
1 SPRAY, SUSPENSION (ML) NASAL
Refills: 4 | COMMUNITY
Start: 2018-11-09 | End: 2019-01-22

## 2019-01-22 ASSESSMENT — PATIENT HEALTH QUESTIONNAIRE - PHQ9: CLINICAL INTERPRETATION OF PHQ2 SCORE: 0

## 2019-01-22 NOTE — PROGRESS NOTES
Chief Complaint   Patient presents with   • Establish Care     pt would like to discuss change in BC due to achne issues       HISTORY OF PRESENT ILLNESS: Fozia Nichole is an established 23 y.o. female here to discuss the evaluation and management of:    Patient is a pleasant 23-year-old female here today to discuss acne vulgaris.  She tells me that she has suffered from acne for several years.  States she is currently taking Tri-Sprintec oral contraceptive for acne.  She tells me that her menstrual cycles are regular and menses is 5 days in duration with mild bleeding.  Admits to menstrual cramping and states menstrual cramping varies in severity.  States the first day of her menstrual period she experiences severe cramping that tapers.  States she takes over-the-counter Midol with alleviation of symptoms.  Denies having a regular exercise routine.  Denies personal or family history of DVT/PEs.  States oral contraceptive is slowly for acne reasons.  Patient is concerned because she still has cystic acne.  States facial regimen is washing face every a.m. and p.m. with Neutrogena stubborn acne and using tamiko moisturizer after washing face.  Patient is inquiring about treatment options.      Patient Active Problem List    Diagnosis Date Noted   • Acne vulgaris 01/22/2019   • Migraine without aura 01/21/2014       Allergies:Patient has no known allergies.    Current Outpatient Prescriptions   Medication Sig Dispense Refill   • Norgestim-Eth Estrad Triphasic 0.18/0.215/0.25 MG-25 MCG Tab Take  by mouth.     • clindamycin (CLEOCIN T) 1 % Gel Apply every a.m. and p.m. to affected areas after washing face. 1 Tube 2     No current facility-administered medications for this visit.        Social History   Substance Use Topics   • Smoking status: Never Smoker   • Smokeless tobacco: Never Used   • Alcohol use Yes      Comment: socially        Family Status   Relation Status   • Mo Alive   • MGMo Alive   • MGFa Alive  "  • Sis Alive   • Neg Hx (Not Specified)     Family History   Problem Relation Age of Onset   • Breast Cancer Mother 52   • Cancer Maternal Grandmother         Ovarian CA    • Heart Disease Maternal Grandfather    • Thyroid Sister         Hyperthyroid    • Stroke Neg Hx        ROS:  Review of Systems   Constitutional: Negative for fever, chills, weight loss and malaise/fatigue.   HENT: Negative for ear pain, nosebleeds, congestion, sore throat and neck pain.    Eyes: Negative for blurred vision.   Respiratory: Negative for cough, sputum production, shortness of breath and wheezing.    Cardiovascular: Negative for chest pain, palpitations, orthopnea and leg swelling.   Gastrointestinal: Negative for heartburn, nausea, vomiting and abdominal pain.   Genitourinary: Negative for dysuria, urgency and frequency.   Musculoskeletal: Negative for myalgias, back pain and joint pain.   Skin: Negative for rash and itching.  Positive for facial acne.  Neurological: Negative for dizziness, tingling, tremors, sensory change, focal weakness and headaches.   Endo/Heme/Allergies: Does not bruise/bleed easily.   Psychiatric/Behavioral: Negative for depression, suicidal ideas and memory loss.  The patient is not nervous/anxious and does not have insomnia.    All other systems reviewed and are negative except as in HPI.    Exam: Blood pressure 110/70, pulse (!) 107, temperature 36.6 °C (97.8 °F), resp. rate 16, height 1.727 m (5' 8\"), weight 91.2 kg (201 lb), SpO2 97 %, not currently breastfeeding. Body mass index is 30.56 kg/m².  General: Normal appearing. No distress.  HEENT: Normocephalic. Eyes conjunctiva clear lids without ptosis, pupils equal and reactive to light accommodation, ears normal shape and contour, canals are clear bilaterally, tympanic membranes are benign, nasal mucosa benign, oropharynx is without erythema, edema or exudates.   Neck: Supple without JVD or bruit. Thyroid is not enlarged.  Pulmonary: Clear to " ausculation.  Normal effort. No rales, ronchi, or wheezing.  Cardiovascular: Regular rate and rhythm without murmur.   Abdomen: Soft, nontender, nondistended. Normal bowel sounds. Liver and spleen are not palpable  Neurologic: Grossly nonfocal.  Cranial nerves are normal.   Lymph: No cervical, supraclavicular or axillary lymph nodes are palpable  Skin: Warm and dry.  No rashes or suspicious skin lesions.  Positive for facial acne on forehead and bilateral cheeks.  Musculoskeletal: Normal gait. No extremity cyanosis, clubbing, or edema.  Psych: Normal mood and affect. Alert and oriented x3. Judgment and insight is normal.    Medical decision-making and discussion:  1. Acne vulgaris  Continue current oral contraceptive method.  Patient has been prescribed clindamycin 1% gel and advised to use over-the-counter benzoyl peroxide 5% in combination with clindamycin gel and advised to apply to affected areas every a.m. and p.m. after washing her face.  If symptoms improve she can decrease it from twice a day to once a day.  By patient if she does not noticed improvement of her symptoms to contact me.  Will consider deferring patient to dermatology.    Discussed the importance of good facial hygiene.    Follow-up for worsening symptoms,lack of expected recovery, or should new symptoms or problems arise.      - clindamycin (CLEOCIN T) 1 % Gel; Apply every a.m. and p.m. to affected areas after washing face.  Dispense: 1 Tube; Refill: 2      Please note that this dictation was created using voice recognition software. I have made every reasonable attempt to correct obvious errors, but I expect that there are errors of grammar and possibly content that I did not discover before finalizing the note.        Return if symptoms worsen or fail to improve.

## 2019-01-24 ENCOUNTER — OFFICE VISIT (OUTPATIENT)
Dept: URGENT CARE | Facility: CLINIC | Age: 24
End: 2019-01-24
Payer: COMMERCIAL

## 2019-01-24 VITALS
OXYGEN SATURATION: 97 % | TEMPERATURE: 97.6 F | HEART RATE: 111 BPM | DIASTOLIC BLOOD PRESSURE: 80 MMHG | SYSTOLIC BLOOD PRESSURE: 110 MMHG | BODY MASS INDEX: 30.92 KG/M2 | HEIGHT: 68 IN | WEIGHT: 204 LBS | RESPIRATION RATE: 14 BRPM

## 2019-01-24 DIAGNOSIS — R09.82 PND (POST-NASAL DRIP): ICD-10-CM

## 2019-01-24 DIAGNOSIS — J06.9 UPPER RESPIRATORY TRACT INFECTION, UNSPECIFIED TYPE: ICD-10-CM

## 2019-01-24 DIAGNOSIS — R09.81 SINUS CONGESTION: ICD-10-CM

## 2019-01-24 PROCEDURE — 99214 OFFICE O/P EST MOD 30 MIN: CPT | Performed by: PHYSICIAN ASSISTANT

## 2019-01-24 RX ORDER — AMOXICILLIN AND CLAVULANATE POTASSIUM 875; 125 MG/1; MG/1
1 TABLET, FILM COATED ORAL 2 TIMES DAILY
Qty: 20 TAB | Refills: 0 | Status: SHIPPED | OUTPATIENT
Start: 2019-01-24 | End: 2019-07-12

## 2019-01-24 RX ORDER — FLUTICASONE PROPIONATE 50 MCG
2 SPRAY, SUSPENSION (ML) NASAL DAILY
Qty: 16 G | Refills: 0 | Status: SHIPPED | OUTPATIENT
Start: 2019-01-24 | End: 2021-01-12

## 2019-01-24 ASSESSMENT — ENCOUNTER SYMPTOMS
SPUTUM PRODUCTION: 0
ABDOMINAL PAIN: 0
CHILLS: 0
VOMITING: 0
SHORTNESS OF BREATH: 0
NAUSEA: 0
WHEEZING: 0
SINUS PAIN: 0
DIARRHEA: 0
SORE THROAT: 1
FEVER: 0
COUGH: 0

## 2019-01-24 NOTE — PROGRESS NOTES
"Subjective:   Fozia Nichole is a 23 y.o. female who presents for Nasal Congestion (runny nose x3 days)        Sinus Problem   This is a new problem. The current episode started in the past 7 days. Associated symptoms include congestion and a sore throat. Pertinent negatives include no chills, coughing, ear pain or shortness of breath.     Notes last 3d of sinus press and congestion, denies ear pain now - resolved, c/o ST at night, denies cough, denies nasuea/voimting/abdpain/diarrhea/rash, PMH of sinusitis, remote PMH of asthma, denies wheeze, denies PMH of bronchitis, denies PMH of pneumonia, PMH of seasonal allerg. Tried using mucinex.     Review of Systems   Constitutional: Negative for chills and fever.   HENT: Positive for congestion and sore throat. Negative for ear pain and sinus pain.    Respiratory: Negative for cough, sputum production, shortness of breath and wheezing.    Gastrointestinal: Negative for abdominal pain, diarrhea, nausea and vomiting.   Skin: Negative for rash.   Endo/Heme/Allergies: Positive for environmental allergies.     No Known Allergies   Objective:   /80 (BP Location: Left arm, Patient Position: Sitting, BP Cuff Size: Adult)   Pulse (!) 111   Temp 36.4 °C (97.6 °F)   Resp 14   Ht 1.727 m (5' 8\")   Wt 92.5 kg (204 lb)   SpO2 97%   BMI 31.02 kg/m²   Physical Exam   Constitutional: She is oriented to person, place, and time. She appears well-developed and well-nourished. No distress.   HENT:   Head: Normocephalic and atraumatic.   Right Ear: Tympanic membrane, external ear and ear canal normal. Tympanic membrane is not scarred.   Left Ear: External ear and ear canal normal. Tympanic membrane is scarred.   Nose: Nose normal. Right sinus exhibits no maxillary sinus tenderness and no frontal sinus tenderness. Left sinus exhibits no maxillary sinus tenderness and no frontal sinus tenderness.   Mouth/Throat: Uvula is midline and mucous membranes are normal. Posterior " oropharyngeal erythema ( mild PND) present. No oropharyngeal exudate, posterior oropharyngeal edema or tonsillar abscesses.   Eyes: Conjunctivae and lids are normal. Right eye exhibits no discharge. Left eye exhibits no discharge. No scleral icterus.   Neck: Neck supple.   Pulmonary/Chest: Effort normal. No respiratory distress. She has no decreased breath sounds. She has no wheezes. She has no rhonchi. She has no rales.   Musculoskeletal: Normal range of motion.   Lymphadenopathy:     She has cervical adenopathy ( mild bilat).   Neurological: She is alert and oriented to person, place, and time. She is not disoriented.   Skin: Skin is warm and dry. She is not diaphoretic. No erythema. No pallor.   Psychiatric: Her speech is normal and behavior is normal.   Nursing note and vitals reviewed.        Assessment/Plan:   1. Upper respiratory tract infection, unspecified type  - amoxicillin-clavulanate (AUGMENTIN) 875-125 MG Tab; Take 1 Tab by mouth 2 times a day.  Dispense: 20 Tab; Refill: 0    2. Sinus congestion  - amoxicillin-clavulanate (AUGMENTIN) 875-125 MG Tab; Take 1 Tab by mouth 2 times a day.  Dispense: 20 Tab; Refill: 0    3. PND (post-nasal drip)  - fluticasone (FLONASE) 50 MCG/ACT nasal spray; Spray 2 Sprays in nose every day.  Dispense: 16 g; Refill: 0  Supportive care is reviewed with patient/caregiver - recommend to push PO fluids and electrolytes, Nsaids/tylenol, netti pot/saline irrig, humidifier in home, flonase, ponaris, antihistamine, Contingent antibiotic prescription given to patient to fill upon meeting criteria of guidelines discussed.   If filling,  take full course of Rx, take with probiotics, observe for resolution  Return to clinic with lack of resolution or progression of symptoms.      Differential diagnosis, natural history, supportive care, and indications for immediate follow-up discussed.

## 2019-03-01 ENCOUNTER — TELEPHONE (OUTPATIENT)
Dept: OBGYN | Facility: CLINIC | Age: 24
End: 2019-03-01

## 2019-03-02 NOTE — TELEPHONE ENCOUNTER
Pt LM on VM requesting refill for birth Control, pt last seen 8/2018  Called [facility] pharmacy, spoke with Ashely and called in Rx, pt notified   TRI-LO-SPRINTEC TABLET  1 pack with 6 refills.

## 2019-07-12 ENCOUNTER — OFFICE VISIT (OUTPATIENT)
Dept: URGENT CARE | Facility: CLINIC | Age: 24
End: 2019-07-12
Payer: COMMERCIAL

## 2019-07-12 ENCOUNTER — HOSPITAL ENCOUNTER (OUTPATIENT)
Facility: MEDICAL CENTER | Age: 24
End: 2019-07-12
Attending: PHYSICIAN ASSISTANT
Payer: COMMERCIAL

## 2019-07-12 VITALS
RESPIRATION RATE: 17 BRPM | DIASTOLIC BLOOD PRESSURE: 80 MMHG | WEIGHT: 202 LBS | HEART RATE: 110 BPM | TEMPERATURE: 98.1 F | OXYGEN SATURATION: 97 % | HEIGHT: 69 IN | BODY MASS INDEX: 29.92 KG/M2 | SYSTOLIC BLOOD PRESSURE: 100 MMHG

## 2019-07-12 DIAGNOSIS — N89.8 VAGINAL ODOR: ICD-10-CM

## 2019-07-12 DIAGNOSIS — N76.0 BACTERIAL VAGINOSIS: Primary | ICD-10-CM

## 2019-07-12 DIAGNOSIS — B96.89 BACTERIAL VAGINOSIS: Primary | ICD-10-CM

## 2019-07-12 LAB
APPEARANCE UR: NORMAL
BILIRUB UR STRIP-MCNC: NEGATIVE MG/DL
CANDIDA DNA VAG QL PROBE+SIG AMP: NEGATIVE
COLOR UR AUTO: NORMAL
G VAGINALIS DNA VAG QL PROBE+SIG AMP: POSITIVE
GLUCOSE UR STRIP.AUTO-MCNC: NEGATIVE MG/DL
KETONES UR STRIP.AUTO-MCNC: NORMAL MG/DL
LEUKOCYTE ESTERASE UR QL STRIP.AUTO: NORMAL
NITRITE UR QL STRIP.AUTO: NEGATIVE
PH UR STRIP.AUTO: 5.5 [PH] (ref 5–8)
PROT UR QL STRIP: NEGATIVE MG/DL
RBC UR QL AUTO: NEGATIVE
SP GR UR STRIP.AUTO: 1.02
T VAGINALIS DNA VAG QL PROBE+SIG AMP: NEGATIVE
UROBILINOGEN UR STRIP-MCNC: 0.2 MG/DL

## 2019-07-12 PROCEDURE — 87480 CANDIDA DNA DIR PROBE: CPT

## 2019-07-12 PROCEDURE — 87510 GARDNER VAG DNA DIR PROBE: CPT

## 2019-07-12 PROCEDURE — 81002 URINALYSIS NONAUTO W/O SCOPE: CPT | Performed by: PHYSICIAN ASSISTANT

## 2019-07-12 PROCEDURE — 87660 TRICHOMONAS VAGIN DIR PROBE: CPT

## 2019-07-12 PROCEDURE — 99214 OFFICE O/P EST MOD 30 MIN: CPT | Performed by: PHYSICIAN ASSISTANT

## 2019-07-12 RX ORDER — METRONIDAZOLE 500 MG/1
500 TABLET ORAL 2 TIMES DAILY
Qty: 14 TAB | Refills: 0 | Status: SHIPPED | OUTPATIENT
Start: 2019-07-12 | End: 2019-07-19

## 2019-07-12 ASSESSMENT — ENCOUNTER SYMPTOMS
CONSTIPATION: 0
ANOREXIA: 0
DIARRHEA: 0
VOMITING: 0
VAGINITIS: 1
SHORTNESS OF BREATH: 0
FLANK PAIN: 0
NAUSEA: 0
CHILLS: 0
FEVER: 0
ABDOMINAL PAIN: 0
COUGH: 0

## 2019-07-12 NOTE — PATIENT INSTRUCTIONS
Bacterial Vaginosis  Bacterial vaginosis is an infection of the vagina. It happens when too many germs (bacteria) grow in the vagina. This infection puts you at risk for infections from sex (STIs). Treating this infection can lower your risk for some STIs. You should also treat this if you are pregnant. It can cause your baby to be born early.  Follow these instructions at home:  Medicines  · Take over-the-counter and prescription medicines only as told by your doctor.  · Take or use your antibiotic medicine as told by your doctor. Do not stop taking or using it even if you start to feel better.  General instructions  · If you your sexual partner is a woman, tell her that you have this infection. She needs to get treatment if she has symptoms. If you have a male partner, he does not need to be treated.  · During treatment:  ¨ Avoid sex.  ¨ Do not douche.  ¨ Avoid alcohol as told.  ¨ Avoid breastfeeding as told.  · Drink enough fluid to keep your pee (urine) clear or pale yellow.  · Keep your vagina and butt (rectum) clean.  ¨ Wash the area with warm water every day.  ¨ Wipe from front to back after you use the toilet.  · Keep all follow-up visits as told by your doctor. This is important.  Preventing this condition  · Do not douche.  · Use only warm water to wash around your vagina.  · Use protection when you have sex. This includes:  ¨ Latex condoms.  ¨ Dental dams.  · Limit how many people you have sex with. It is best to only have sex with the same person (be monogamous).  · Get tested for STIs. Have your partner get tested.  · Wear underwear that is cotton or lined with cotton.  · Avoid tight pants and pantyhose. This is most important in summer.  · Do not use any products that have nicotine or tobacco in them. These include cigarettes and e-cigarettes. If you need help quitting, ask your doctor.  · Do not use illegal drugs.  · Limit how much alcohol you drink.  Contact a doctor if:  · Your symptoms do not get  better, even after you are treated.  · You have more discharge or pain when you pee (urinate).  · You have a fever.  · You have pain in your belly (abdomen).  · You have pain with sex.  · Your bleed from your vagina between periods.  Summary  · This infection happens when too many germs (bacteria) grow in the vagina.  · Treating this condition can lower your risk for some infections from sex (STIs).  · You should also treat this if you are pregnant. It can cause early (premature) birth.  · Do not stop taking or using your antibiotic medicine even if you start to feel better.  This information is not intended to replace advice given to you by your health care provider. Make sure you discuss any questions you have with your health care provider.  Document Released: 09/26/2009 Document Revised: 09/02/2017 Document Reviewed: 09/02/2017  [employer] Interactive Patient Education © 2017 [employer]

## 2019-07-12 NOTE — PROGRESS NOTES
Subjective:   Fozia Nichole is a 24 y.o. female who presents for Vaginal Discharge (vaginal odor, x2wks)        Vaginitis   The patient's primary symptoms include a genital odor. The problem occurs constantly. The problem has been unchanged. She is not pregnant. Pertinent negatives include no abdominal pain, anorexia, chills, constipation, diarrhea, dysuria, fever, flank pain, frequency, hematuria, nausea, urgency or vomiting. She has tried nothing for the symptoms. She is sexually active (Female partner). She uses nothing for contraception. There is no history of PID, an STD or vaginosis.     Recent STI test- neg. Declines repeat testing at this time.     Review of Systems   Constitutional: Negative for chills, fever and malaise/fatigue.   Respiratory: Negative for cough and shortness of breath.    Gastrointestinal: Negative for abdominal pain, anorexia, constipation, diarrhea, nausea and vomiting.   Genitourinary: Negative for dysuria, flank pain, frequency, hematuria and urgency.        Pos Vaginal odor   All other systems reviewed and are negative.      PMH:  has a past medical history of Migraine headache and Ulcer. She also has no past medical history of Asthma; Cancer ([facility]); Diabetes ([facility]); Hyperlipidemia; or Hypertension.  MEDS:   Current Outpatient Prescriptions:   •  metroNIDAZOLE (FLAGYL) 500 MG Tab, Take 1 Tab by mouth 2 Times a Day for 7 days., Disp: 14 Tab, Rfl: 0  •  Norgestim-Eth Estrad Triphasic 0.18/0.215/0.25 MG-25 MCG Tab, Take  by mouth., Disp: , Rfl:   •  fluticasone (FLONASE) 50 MCG/ACT nasal spray, Spray 2 Sprays in nose every day., Disp: 16 g, Rfl: 0  ALLERGIES: No Known Allergies  SURGHX:   Past Surgical History:   Procedure Laterality Date   • TONSILLECTOMY     • TYMPANOPLASTY       SOCHX:  reports that she has never smoked. She has never used smokeless tobacco. She reports that she drinks alcohol. She reports that she does not use drugs.  Family History   Problem Relation Age of  "Onset   • Breast Cancer Mother 52   • Cancer Maternal Grandmother         Ovarian CA    • Heart Disease Maternal Grandfather    • Thyroid Sister         Hyperthyroid    • Stroke Neg Hx         Objective:   /80 (BP Location: Left arm, Patient Position: Sitting, BP Cuff Size: Large adult)   Pulse (!) 110   Temp 36.7 °C (98.1 °F) (Temporal)   Resp 17   Ht 1.753 m (5' 9\")   Wt 91.6 kg (202 lb)   LMP 06/24/2019   SpO2 97%   Breastfeeding? No   BMI 29.83 kg/m²     Physical Exam   Constitutional: She is oriented to person, place, and time. She appears well-developed and well-nourished. No distress.   HENT:   Head: Normocephalic and atraumatic.   Nose: Nose normal.   Eyes: Pupils are equal, round, and reactive to light. Conjunctivae are normal.   Neck: Normal range of motion. Neck supple. No tracheal deviation present.   Cardiovascular: Normal rate and regular rhythm.    Pulmonary/Chest: Effort normal and breath sounds normal.   Abdominal: Soft. Bowel sounds are normal. There is no CVA tenderness.   Genitourinary: Pelvic exam was performed with patient prone. There is no tenderness or lesion on the right labia. There is no tenderness or lesion on the left labia. Cervix exhibits no motion tenderness. No erythema in the vagina. Vaginal discharge (Thin white discharge) found.   Neurological: She is alert and oriented to person, place, and time.   Skin: Skin is warm and dry. Capillary refill takes less than 2 seconds.   Psychiatric: She has a normal mood and affect. Her behavior is normal.   Vitals reviewed.    Lab Results   Component Value Date/Time    POCCOLOR dark yellow 07/12/2019 09:02 AM    POCAPPEAR cloudy 07/12/2019 09:02 AM    POCLEUKEST small 07/12/2019 09:02 AM    POCNITRITE negative 07/12/2019 09:02 AM    POCUROBILIGE 0.2 07/12/2019 09:02 AM    POCPROTEIN negative 07/12/2019 09:02 AM    POCURPH 5.5 07/12/2019 09:02 AM    POCBLOOD negative 07/12/2019 09:02 AM    POCSPGRV 1.025 07/12/2019 09:02 AM    " POCKETONES trace 07/12/2019 09:02 AM    POCBILIRUBIN negative 07/12/2019 09:02 AM    POCGLUCUA negative 07/12/2019 09:02 AM            Assessment/Plan:     1. Bacterial vaginosis  metroNIDAZOLE (FLAGYL) 500 MG Tab    VAGINAL PATHOGENS DNA PANEL    POCT Urinalysis     The patient will be empirically treated with Flagyl.  She will be notified of final culture results.  Educational handout on bacterial vaginosis provided.    Follow-up with primary care provider.  If symptoms worsen or persist patient can return to clinic for reevaluation. All side effects of medication discussed including allergic response, GI upset, tendon injury, etc. Patient verbalized understanding of information.    Please note that this dictation was created using voice recognition software. I have made every reasonable attempt to correct obvious errors, but I expect that there are errors of grammar and possibly content that I did not discover before finalizing the note.

## 2019-07-16 ENCOUNTER — OFFICE VISIT (OUTPATIENT)
Dept: BEHAVIORAL HEALTH | Facility: CLINIC | Age: 24
End: 2019-07-16
Payer: COMMERCIAL

## 2019-07-16 DIAGNOSIS — F43.10 PTSD (POST-TRAUMATIC STRESS DISORDER): ICD-10-CM

## 2019-07-16 PROBLEM — F43.21 ADJUSTMENT DISORDER WITH DEPRESSED MOOD: Status: ACTIVE | Noted: 2019-07-16

## 2019-07-16 PROCEDURE — 90791 PSYCH DIAGNOSTIC EVALUATION: CPT | Performed by: PSYCHOLOGIST
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
## 2019-08-02 ENCOUNTER — OFFICE VISIT (OUTPATIENT)
Dept: URGENT CARE | Facility: CLINIC | Age: 24
End: 2019-08-02
Payer: COMMERCIAL

## 2019-08-02 VITALS
BODY MASS INDEX: 30.31 KG/M2 | RESPIRATION RATE: 14 BRPM | HEIGHT: 68 IN | DIASTOLIC BLOOD PRESSURE: 70 MMHG | SYSTOLIC BLOOD PRESSURE: 124 MMHG | HEART RATE: 98 BPM | WEIGHT: 200 LBS | OXYGEN SATURATION: 100 % | TEMPERATURE: 98.2 F

## 2019-08-02 DIAGNOSIS — R10.13 EPIGASTRIC PAIN: ICD-10-CM

## 2019-08-02 LAB
APPEARANCE UR: CLEAR
BILIRUB UR STRIP-MCNC: NORMAL MG/DL
COLOR UR AUTO: YELLOW
GLUCOSE UR STRIP.AUTO-MCNC: NORMAL MG/DL
INT CON NEG: NEGATIVE
INT CON POS: POSITIVE
KETONES UR STRIP.AUTO-MCNC: NORMAL MG/DL
LEUKOCYTE ESTERASE UR QL STRIP.AUTO: NORMAL
NITRITE UR QL STRIP.AUTO: NORMAL
PH UR STRIP.AUTO: 6 [PH] (ref 5–8)
POC URINE PREGNANCY TEST: NEGATIVE
PROT UR QL STRIP: NORMAL MG/DL
RBC UR QL AUTO: NORMAL
SP GR UR STRIP.AUTO: 1.02
UROBILINOGEN UR STRIP-MCNC: 1 MG/DL

## 2019-08-02 PROCEDURE — 81002 URINALYSIS NONAUTO W/O SCOPE: CPT | Performed by: PHYSICIAN ASSISTANT

## 2019-08-02 PROCEDURE — 99214 OFFICE O/P EST MOD 30 MIN: CPT | Performed by: PHYSICIAN ASSISTANT

## 2019-08-02 PROCEDURE — 81025 URINE PREGNANCY TEST: CPT | Performed by: PHYSICIAN ASSISTANT

## 2019-08-02 RX ORDER — OMEPRAZOLE 20 MG/1
20 CAPSULE, DELAYED RELEASE ORAL DAILY
Qty: 30 CAP | Refills: 0 | Status: SHIPPED | OUTPATIENT
Start: 2019-08-02 | End: 2021-01-12

## 2019-08-03 NOTE — PATIENT INSTRUCTIONS
Food Choices for Gastroesophageal Reflux Disease, Adult  When you have gastroesophageal reflux disease (GERD), the foods you eat and your eating habits are very important. Choosing the right foods can help ease your discomfort.   WHAT GUIDELINES DO I NEED TO FOLLOW?   · Choose fruits, vegetables, whole grains, and low-fat dairy products.    · Choose low-fat meat, fish, and poultry.  · Limit fats such as oils, salad dressings, butter, nuts, and avocado.    · Keep a food diary. This helps you identify foods that cause symptoms.    · Avoid foods that cause symptoms. These may be different for everyone.    · Eat small meals often instead of 3 large meals a day.    · Eat your meals slowly, in a place where you are relaxed.    · Limit fried foods.    · Cook foods using methods other than frying.    · Avoid drinking alcohol.    · Avoid drinking large amounts of liquids with your meals.    · Avoid bending over or lying down until 2-3 hours after eating.    WHAT FOODS ARE NOT RECOMMENDED?   These are some foods and drinks that may make your symptoms worse:  Vegetables  Tomatoes. Tomato juice. Tomato and spaghetti sauce. Chili peppers. Onion and garlic. Horseradish.  Fruits  Oranges, grapefruit, and lemon (fruit and juice).  Meats  High-fat meats, fish, and poultry. This includes hot dogs, ribs, ham, sausage, salami, and sequeira.  Dairy  Whole milk and chocolate milk. Sour cream. Cream. Butter. Ice cream. Cream cheese.   Drinks  Coffee and tea. Bubbly (carbonated) drinks or energy drinks.  Condiments  Hot sauce. Barbecue sauce.   Sweets/Desserts  Chocolate and cocoa. Donuts. Peppermint and spearmint.  Fats and Oils  High-fat foods. This includes French fries and potato chips.  Other  Vinegar. Strong spices. This includes black pepper, white pepper, red pepper, cayenne, mccauley powder, cloves, ginger, and chili powder.  The items listed above may not be a complete list of foods and drinks to avoid. Contact your dietitian for more  information.     This information is not intended to replace advice given to you by your health care provider. Make sure you discuss any questions you have with your health care provider.     Document Released: 06/18/2013 Document Revised: 01/08/2016 Document Reviewed: 10/22/2014  [employer] Interactive Patient Education ©2016 [employer]

## 2019-08-05 ASSESSMENT — ENCOUNTER SYMPTOMS
FLATUS: 0
VOMITING: 0
NAUSEA: 0
CHILLS: 0
DIARRHEA: 0
SHORTNESS OF BREATH: 0
FEVER: 0
FLANK PAIN: 0
CONSTIPATION: 0
DIZZINESS: 0
MUSCULOSKELETAL NEGATIVE: 1
ABDOMINAL PAIN: 1

## 2019-08-05 ASSESSMENT — CROHNS DISEASE ACTIVITY INDEX (CDAI): CDAI SCORE: 0

## 2019-08-05 NOTE — PROGRESS NOTES
"Subjective:      Fozia Nichole is a 24 y.o. female who presents with Abdominal Pain (upper abd pain x 2 weeks)            Abdominal Pain   This is a new problem. The current episode started 1 to 4 weeks ago (2 weeks). The problem occurs constantly. The problem has been gradually worsening. The pain is located in the epigastric region. The pain is at a severity of 4/10. The pain is moderate. The quality of the pain is aching and sharp. The abdominal pain does not radiate. Pertinent negatives include no constipation, diarrhea, dysuria, fever, flatus, frequency, hematuria, nausea or vomiting. The pain is aggravated by eating and palpation. The pain is relieved by certain positions. She has tried nothing for the symptoms. There is no history of abdominal surgery, Crohn's disease, gallstones, GERD, irritable bowel syndrome, pancreatitis, PUD or ulcerative colitis.     Patient presents to urgent care reporting a 2 week history of mild upper abdominal pain, gradually worsening. No history of the same. No history of frequent nsaid use of PUD. She hasn't tried taking any OTC medications for the pain. No fevers, chills, body aches, constipation, diarrhea, nausea, vomiting, urinary symptoms, or bloody stools.     Review of Systems   Constitutional: Negative for chills and fever.   HENT: Negative for congestion.    Respiratory: Negative for shortness of breath.    Cardiovascular: Negative for chest pain.   Gastrointestinal: Positive for abdominal pain. Negative for constipation, diarrhea, flatus, nausea and vomiting.   Genitourinary: Negative.  Negative for dysuria, flank pain, frequency, hematuria and urgency.   Musculoskeletal: Negative.    Skin: Negative for rash.   Neurological: Negative for dizziness.        Objective:     /70 (BP Location: Left arm, Patient Position: Sitting, BP Cuff Size: Adult)   Pulse 98   Temp 36.8 °C (98.2 °F) (Temporal)   Resp 14   Ht 1.727 m (5' 8\")   Wt 90.7 kg (200 lb)   SpO2 " 100%   BMI 30.41 kg/m²      Physical Exam   Constitutional: She is oriented to person, place, and time. She appears well-developed and well-nourished. No distress.   HENT:   Head: Normocephalic and atraumatic.   Eyes: Pupils are equal, round, and reactive to light. Conjunctivae are normal.   Neck: Normal range of motion.   Cardiovascular: Normal rate.   Pulmonary/Chest: Effort normal.   Abdominal: Soft. Normal appearance and bowel sounds are normal. There is tenderness in the epigastric area. There is no rebound, no CVA tenderness, no tenderness at McBurney's point and negative Arzate's sign.       Musculoskeletal: Normal range of motion.   Neurological: She is alert and oriented to person, place, and time.   Skin: Skin is warm and dry. She is not diaphoretic.   Psychiatric: She has a normal mood and affect. Her behavior is normal.   Nursing note and vitals reviewed.         PMH:  has a past medical history of Migraine headache and Ulcer. She also has no past medical history of Asthma, Cancer ([facility]), Diabetes ([facility]), Hyperlipidemia, or Hypertension.  MEDS:   Current Outpatient Medications:   •  omeprazole (PRILOSEC) 20 MG delayed-release capsule, Take 1 Cap by mouth every day., Disp: 30 Cap, Rfl: 0  •  fluticasone (FLONASE) 50 MCG/ACT nasal spray, Spray 2 Sprays in nose every day., Disp: 16 g, Rfl: 0  •  Norgestim-Eth Estrad Triphasic 0.18/0.215/0.25 MG-25 MCG Tab, Take  by mouth., Disp: , Rfl:   ALLERGIES: No Known Allergies  SURGHX:   Past Surgical History:   Procedure Laterality Date   • TONSILLECTOMY     • TYMPANOPLASTY       SOCHX:  reports that she has never smoked. She has never used smokeless tobacco. She reports that she drinks alcohol. She reports that she has current or past drug history. Drug: Marijuana.  FH: family history includes Breast Cancer (age of onset: 52) in her mother; Cancer in her maternal grandmother; Heart Disease in her maternal grandfather; Thyroid in her sister.    POCT Urinalysis:  Ref  Range & Units 3d ago    POC Color Negative yellow    POC Appearance Negative clear    POC Leukocyte Esterase Negative neg    POC Nitrites Negative neg    POC Urobiligen Negative (0.2) mg/dL 1.0    POC Protein Negative mg/dL neg    POC Urine PH 5.0 - 8.0 6.0    POC Blood Negative trace    POC Specific Gravity <1.005 - >1.030 1.020    POC Ketones Negative mg/dL neg    POC Bilirubin Negative mg/dL neg    POC Glucose Negative mg/dL neg           Assessment/Plan:     1. Epigastric pain  - POCT Urinalysis --> nornaml  - POCT Pregnancy --> negative   - CBC WITH DIFFERENTIAL; Future  - Comp Metabolic Panel; Future  - LIPASE; Future  - H. PYLORI BREATH TEST  - omeprazole (PRILOSEC) 20 MG delayed-release capsule; Take 1 Cap by mouth every day.  Dispense: 30 Cap; Refill: 0    GI cocktail given at today's visit with good resolution of symptoms. Blood work given at today's visit to have performed at her convenience. She will start taking prilosec daily after submitting breath test for H pylori. GERD diet discussed. Pending blood work results. The patient demonstrated a good understanding and agreed with the treatment plan.

## 2019-09-16 ENCOUNTER — TELEPHONE (OUTPATIENT)
Dept: OBGYN | Facility: CLINIC | Age: 24
End: 2019-09-16

## 2019-09-18 ENCOUNTER — TELEPHONE (OUTPATIENT)
Dept: OBGYN | Facility: CLINIC | Age: 24
End: 2019-09-18

## 2019-09-18 NOTE — TELEPHONE ENCOUNTER
Pt called and left msg re:BC refill.  Unable to contact pt, msg left to call back.     Per pt msg left to notify that she needs to set up an annual appt to be able to get new Rx.

## 2019-09-23 ENCOUNTER — HOSPITAL ENCOUNTER (OUTPATIENT)
Facility: MEDICAL CENTER | Age: 24
End: 2019-09-23
Attending: FAMILY MEDICINE
Payer: COMMERCIAL

## 2019-09-23 ENCOUNTER — OFFICE VISIT (OUTPATIENT)
Dept: URGENT CARE | Facility: PHYSICIAN GROUP | Age: 24
End: 2019-09-23
Payer: COMMERCIAL

## 2019-09-23 VITALS
HEART RATE: 100 BPM | HEIGHT: 68 IN | WEIGHT: 200 LBS | TEMPERATURE: 98.7 F | DIASTOLIC BLOOD PRESSURE: 70 MMHG | SYSTOLIC BLOOD PRESSURE: 122 MMHG | OXYGEN SATURATION: 97 % | BODY MASS INDEX: 30.31 KG/M2

## 2019-09-23 DIAGNOSIS — N30.00 ACUTE CYSTITIS WITHOUT HEMATURIA: ICD-10-CM

## 2019-09-23 LAB
APPEARANCE UR: CLEAR
BILIRUB UR STRIP-MCNC: NEGATIVE MG/DL
COLOR UR AUTO: YELLOW
GLUCOSE UR STRIP.AUTO-MCNC: NEGATIVE MG/DL
KETONES UR STRIP.AUTO-MCNC: NEGATIVE MG/DL
LEUKOCYTE ESTERASE UR QL STRIP.AUTO: NORMAL
NITRITE UR QL STRIP.AUTO: NEGATIVE
PH UR STRIP.AUTO: 7 [PH] (ref 5–8)
PROT UR QL STRIP: NEGATIVE MG/DL
RBC UR QL AUTO: NEGATIVE
SP GR UR STRIP.AUTO: 1.01
UROBILINOGEN UR STRIP-MCNC: 0.2 MG/DL

## 2019-09-23 PROCEDURE — 99214 OFFICE O/P EST MOD 30 MIN: CPT | Performed by: FAMILY MEDICINE

## 2019-09-23 PROCEDURE — 87510 GARDNER VAG DNA DIR PROBE: CPT

## 2019-09-23 PROCEDURE — 87660 TRICHOMONAS VAGIN DIR PROBE: CPT

## 2019-09-23 PROCEDURE — 87491 CHLMYD TRACH DNA AMP PROBE: CPT

## 2019-09-23 PROCEDURE — 87591 N.GONORRHOEAE DNA AMP PROB: CPT

## 2019-09-23 PROCEDURE — 87480 CANDIDA DNA DIR PROBE: CPT

## 2019-09-23 PROCEDURE — 81002 URINALYSIS NONAUTO W/O SCOPE: CPT | Performed by: FAMILY MEDICINE

## 2019-09-23 RX ORDER — NITROFURANTOIN 25; 75 MG/1; MG/1
100 CAPSULE ORAL 2 TIMES DAILY
Qty: 10 CAP | Refills: 0 | Status: SHIPPED | OUTPATIENT
Start: 2019-09-23 | End: 2019-09-28

## 2019-09-23 RX ORDER — NORGESTIMATE AND ETHINYL ESTRADIOL 7DAYSX3 LO
1 KIT ORAL DAILY
Qty: 90 TAB | Refills: 0 | Status: SHIPPED | OUTPATIENT
Start: 2019-09-23 | End: 2021-01-12

## 2019-09-24 DIAGNOSIS — N76.0 BV (BACTERIAL VAGINOSIS): ICD-10-CM

## 2019-09-24 DIAGNOSIS — B96.89 BV (BACTERIAL VAGINOSIS): ICD-10-CM

## 2019-09-24 LAB
CANDIDA DNA VAG QL PROBE+SIG AMP: NEGATIVE
G VAGINALIS DNA VAG QL PROBE+SIG AMP: POSITIVE
T VAGINALIS DNA VAG QL PROBE+SIG AMP: NEGATIVE

## 2019-09-24 RX ORDER — METRONIDAZOLE 500 MG/1
500 TABLET ORAL EVERY 12 HOURS
Qty: 14 TAB | Refills: 0 | Status: SHIPPED | OUTPATIENT
Start: 2019-09-24 | End: 2019-10-01

## 2019-09-25 LAB
C TRACH DNA SPEC QL NAA+PROBE: NEGATIVE
N GONORRHOEA DNA SPEC QL NAA+PROBE: NEGATIVE
SPECIMEN SOURCE: NORMAL

## 2019-09-28 NOTE — PROGRESS NOTES
Chief Complaint   Patient presents with   • Vaginal Itching, dysuria                            DYSURIA    This is a new problem. The current episode started in the past 4 days. The problem occurs constantly. The problem has been unchanged. Associated symptoms include : vaginal itching, but denies d/c.         Pertinent negatives include no change in bowel habit, chest pain, chills, visual change, vomiting or weakness. Nothing aggravates the symptoms. She has tried nothing for the symptoms.        #2.  Also requests refill on OCP.   Taking daily for several years - denies side effects.     Social History     Socioeconomic History   • Marital status: Single     Spouse name: Not on file   • Number of children: Not on file   • Years of education: Not on file   • Highest education level: Not on file   Occupational History   • Not on file   Social Needs   • Financial resource strain: Not on file   • Food insecurity:     Worry: Not on file     Inability: Not on file   • Transportation needs:     Medical: Not on file     Non-medical: Not on file   Tobacco Use   • Smoking status: Never Smoker   • Smokeless tobacco: Never Used   Substance and Sexual Activity   • Alcohol use: Yes     Comment: socially    • Drug use: Yes     Types: Marijuana   • Sexual activity: Yes     Partners: Female   Lifestyle   • Physical activity:     Days per week: Not on file     Minutes per session: Not on file   • Stress: Not on file   Relationships   • Social connections:     Talks on phone: Not on file     Gets together: Not on file     Attends Voodoo service: Not on file     Active member of club or organization: Not on file     Attends meetings of clubs or organizations: Not on file     Relationship status: Not on file   • Intimate partner violence:     Fear of current or ex partner: Not on file     Emotionally abused: Not on file     Physically abused: Not on file     Forced sexual activity: Not on file   Other Topics Concern   • Not on file  "  Social History Narrative   • Not on file       Past medical history was unremarkable and not pertinent to current issue    Current Outpatient Medications on File Prior to Visit   Medication Sig Dispense Refill   • omeprazole (PRILOSEC) 20 MG delayed-release capsule Take 1 Cap by mouth every day. (Patient not taking: Reported on 9/23/2019) 30 Cap 0   • fluticasone (FLONASE) 50 MCG/ACT nasal spray Spray 2 Sprays in nose every day. (Patient not taking: Reported on 9/23/2019) 16 g 0     No current facility-administered medications on file prior to visit.          Review of Systems   Constitutional: Negative for fever, chills and malaise/fatigue.   Eyes: Negative for vision changes, d/c.    Respiratory: Negative for cough and sputum production.    Cardiovascular: Negative for chest pain and palpitations.   Gastrointestinal: Negative for nausea, vomiting, abdominal pain, diarrhea and constipation.   Genitourinary: + for dysuria, urgency and frequency.  denies flank pain  Skin: Negative for rash .   .   Neurological: Negative for dizziness and tingling.   Psychiatric/Behavioral: Negative for depression.   Hematologic/lymphatic - denies bruising or excessive bleeding  All other systems reviewed and are negative.       Objective:   /70   Pulse 100   Temp 37.1 °C (98.7 °F) (Temporal)   Ht 1.727 m (5' 8\")   Wt 90.7 kg (200 lb)   SpO2 97%       Physical Exam  HEENT - PERRLA, EOMI  Neuro - alert and oriented x3. CN 2-12 grossly intact.  Lungs - CTA. No wheezes, rhonchi or rales.  Heart - regular rate and rhythm without murmur.  Abdomen - soft and non-tender, bowel sounds active x4.   No flank pain  Musculoskeletal - No lower extremity edema noted.         Lab Results   Component Value Date/Time    POCCOLOR yellow 09/23/2019 06:30 PM    POCAPPEAR clear 09/23/2019 06:30 PM    POCLEUKEST trace 09/23/2019 06:30 PM    POCNITRITE negative 09/23/2019 06:30 PM    POCUROBILIGE 0.2 09/23/2019 06:30 PM    POCPROTEIN negative " 09/23/2019 06:30 PM    POCURPH 7.0 09/23/2019 06:30 PM    POCBLOOD negative 09/23/2019 06:30 PM    POCSPGRV 1.015 09/23/2019 06:30 PM    POCKETONES negative 09/23/2019 06:30 PM    POCBILIRUBIN negative 09/23/2019 06:30 PM    POCGLUCUA negative 09/23/2019 06:30 PM           Assessment/Plan:     1. Acute cystitis without hematuria  UA c/w UTI     - nitrofurantoin monohyd macro (MACROBID) 100 MG Cap; Take 1 Cap by mouth 2 times a day for 5 days.  Dispense: 10 Cap; Refill: 0  - VAGINAL PATHOGENS DNA PANEL; Future  - Chlamydia/GC PCR Urine Or Swab; Future  - REFERRAL TO GYNECOLOGY

## 2020-03-04 ENCOUNTER — OFFICE VISIT (OUTPATIENT)
Dept: URGENT CARE | Facility: PHYSICIAN GROUP | Age: 25
End: 2020-03-04
Payer: COMMERCIAL

## 2020-03-04 VITALS
DIASTOLIC BLOOD PRESSURE: 82 MMHG | BODY MASS INDEX: 30.31 KG/M2 | OXYGEN SATURATION: 96 % | SYSTOLIC BLOOD PRESSURE: 140 MMHG | WEIGHT: 200 LBS | RESPIRATION RATE: 20 BRPM | HEIGHT: 68 IN | TEMPERATURE: 100 F | HEART RATE: 128 BPM

## 2020-03-04 DIAGNOSIS — J10.1 INFLUENZA A: ICD-10-CM

## 2020-03-04 DIAGNOSIS — H66.003 NON-RECURRENT ACUTE SUPPURATIVE OTITIS MEDIA OF BOTH EARS WITHOUT SPONTANEOUS RUPTURE OF TYMPANIC MEMBRANES: ICD-10-CM

## 2020-03-04 LAB
FLUAV+FLUBV AG SPEC QL IA: NORMAL
INT CON NEG: NEGATIVE
INT CON POS: POSITIVE

## 2020-03-04 PROCEDURE — 99214 OFFICE O/P EST MOD 30 MIN: CPT | Performed by: PHYSICIAN ASSISTANT

## 2020-03-04 PROCEDURE — 87804 INFLUENZA ASSAY W/OPTIC: CPT | Performed by: PHYSICIAN ASSISTANT

## 2020-03-04 RX ORDER — CODEINE PHOSPHATE/GUAIFENESIN 10-100MG/5
5 LIQUID (ML) ORAL 3 TIMES DAILY PRN
Qty: 100 ML | Refills: 0 | Status: SHIPPED | OUTPATIENT
Start: 2020-03-04 | End: 2020-03-11

## 2020-03-04 RX ORDER — OSELTAMIVIR PHOSPHATE 75 MG/1
75 CAPSULE ORAL 2 TIMES DAILY
Qty: 10 CAP | Refills: 0 | Status: SHIPPED | OUTPATIENT
Start: 2020-03-04 | End: 2021-01-12

## 2020-03-04 RX ORDER — AMOXICILLIN AND CLAVULANATE POTASSIUM 875; 125 MG/1; MG/1
1 TABLET, FILM COATED ORAL 2 TIMES DAILY
Qty: 14 TAB | Refills: 0 | Status: SHIPPED | OUTPATIENT
Start: 2020-03-04 | End: 2020-03-11

## 2020-03-04 ASSESSMENT — ENCOUNTER SYMPTOMS
CHILLS: 1
MUSCULOSKELETAL NEGATIVE: 1
EYE DISCHARGE: 0
RHINORRHEA: 1
SINUS PAIN: 1
DIARRHEA: 0
VOMITING: 0
SPUTUM PRODUCTION: 0
DIZZINESS: 0
FEVER: 1
NAUSEA: 0
ABDOMINAL PAIN: 0
NECK PAIN: 0
HEMOPTYSIS: 0
SORE THROAT: 1
SHORTNESS OF BREATH: 0
EYE REDNESS: 0
HEADACHES: 1
COUGH: 1

## 2020-03-05 NOTE — PROGRESS NOTES
"Subjective:      Fozia Nichole is a 24 y.o. female who presents with Congestion (R/L ear pain, sore throat, x1 month)            Otalgia    There is pain in both ears. This is a new problem. The current episode started yesterday. The problem occurs constantly. The problem has been gradually worsening. The maximum temperature recorded prior to her arrival was 100.4 - 100.9 F. The fever has been present for less than 1 day. The pain is at a severity of 3/10. The pain is moderate. Associated symptoms include coughing, headaches, rhinorrhea and a sore throat. Pertinent negatives include no abdominal pain, diarrhea, ear discharge, neck pain, rash or vomiting. She has tried nothing for the symptoms. There is no history of a chronic ear infection or hearing loss.     Patient presents to urgent care reporting a 1 month history of nasal congestion and sinus pressure. Last night her symptoms worsened significantly with development of fever, chills, body aches, cough, and bilateral ear pain. No recent use of antibiotics. She hasn't received a flu shot this year.     Review of Systems   Constitutional: Positive for chills and fever.   HENT: Positive for congestion, ear pain, rhinorrhea, sinus pain and sore throat. Negative for ear discharge.    Eyes: Negative for discharge and redness.   Respiratory: Positive for cough. Negative for hemoptysis, sputum production and shortness of breath.    Cardiovascular: Negative for chest pain.   Gastrointestinal: Negative for abdominal pain, diarrhea, nausea and vomiting.   Genitourinary: Negative.    Musculoskeletal: Negative.  Negative for neck pain.   Skin: Negative for rash.   Neurological: Positive for headaches. Negative for dizziness.        Objective:     /82   Pulse (!) 128   Temp 37.8 °C (100 °F) (Temporal)   Resp 20   Ht 1.727 m (5' 8\")   Wt 90.7 kg (200 lb)   SpO2 96%   BMI 30.41 kg/m²      Physical Exam  Vitals signs and nursing note reviewed. "   Constitutional:       General: She is not in acute distress.     Appearance: Normal appearance. She is well-developed. She is ill-appearing. She is not diaphoretic.   HENT:      Head: Normocephalic and atraumatic.      Right Ear: Hearing, ear canal and external ear normal. There is no impacted cerumen. Tympanic membrane is injected and bulging.      Left Ear: Hearing, ear canal and external ear normal. There is no impacted cerumen. Tympanic membrane is injected and bulging.      Nose: Congestion present.      Mouth/Throat:      Mouth: Mucous membranes are moist.      Pharynx: Posterior oropharyngeal erythema present. No oropharyngeal exudate.   Eyes:      General:         Right eye: No discharge.         Left eye: No discharge.      Conjunctiva/sclera: Conjunctivae normal.      Pupils: Pupils are equal, round, and reactive to light.   Neck:      Musculoskeletal: Normal range of motion.   Cardiovascular:      Rate and Rhythm: Regular rhythm. Tachycardia present.      Heart sounds: Normal heart sounds. No murmur.   Pulmonary:      Effort: Pulmonary effort is normal.      Breath sounds: Normal breath sounds. No rales.   Musculoskeletal: Normal range of motion.   Skin:     General: Skin is warm and dry.   Neurological:      Mental Status: She is alert and oriented to person, place, and time.   Psychiatric:         Behavior: Behavior normal.            PMH:  has a past medical history of Migraine headache and Ulcer. She also has no past medical history of Asthma, Cancer ([facility]), Diabetes ([facility]), Hyperlipidemia, or Hypertension.  MEDS:   Current Outpatient Medications:   •  oseltamivir (TAMIFLU) 75 MG Cap, Take 1 Cap by mouth 2 times a day., Disp: 10 Cap, Rfl: 0  •  amoxicillin-clavulanate (AUGMENTIN) 875-125 MG Tab, Take 1 Tab by mouth 2 times a day for 7 days., Disp: 14 Tab, Rfl: 0  •  guaifenesin-codeine (TUSSI-ORGANIDIN NR) 100-10 MG/5ML syrup, Take 5 mL by mouth 3 times a day as needed for up to 7 days., Disp: 100 mL,  Rfl: 0  •  Norgestim-Eth Estrad Triphasic 0.18/0.215/0.25 MG-25 MCG Tab, Take 1 Each by mouth every day. (Patient not taking: Reported on 3/4/2020), Disp: 90 Tab, Rfl: 0  •  omeprazole (PRILOSEC) 20 MG delayed-release capsule, Take 1 Cap by mouth every day. (Patient not taking: Reported on 9/23/2019), Disp: 30 Cap, Rfl: 0  •  fluticasone (FLONASE) 50 MCG/ACT nasal spray, Spray 2 Sprays in nose every day. (Patient not taking: Reported on 9/23/2019), Disp: 16 g, Rfl: 0  ALLERGIES: No Known Allergies  SURGHX:   Past Surgical History:   Procedure Laterality Date   • TONSILLECTOMY     • TYMPANOPLASTY       SOCHX:  reports that she has never smoked. She has never used smokeless tobacco. She reports previous alcohol use. She reports current drug use. Drug: Marijuana.  FH: family history includes Breast Cancer (age of onset: 52) in her mother; Cancer in her maternal grandmother; Heart Disease in her maternal grandfather; Thyroid in her sister.     Assessment/Plan:       1. Influenza A  - POCT Influenza A/B: POSITIVE A  - oseltamivir (TAMIFLU) 75 MG Cap; Take 1 Cap by mouth 2 times a day.  Dispense: 10 Cap; Refill: 0  - guaifenesin-codeine (TUSSI-ORGANIDIN NR) 100-10 MG/5ML syrup; Take 5 mL by mouth 3 times a day as needed for up to 7 days.  Dispense: 100 mL; Refill: 0   - Will cause sedation, avoid driving, operating heavy machinery, and drinking alcohol    - It has been >48 hours since symptoms onset, no benefit to treating with Tamiflu  - Increased rest and fluids  - OTC ibuprofen or tylenol as needed for fever control  - Encouraged frequent handwashing for her and her entire family  - Encouraged to wear a mask in public until she is feeling better    - Advised to have any close contacts come in for flu testing promptly if they come down with any symptoms  - Discussed possible complication of pneumonia, encouraged to present to clinic or go to ER if cough does not resolve after 7-10 days, it becomes productive in nature,  she experiences difficulty breathing, or there is worsening fever      2. Non-recurrent acute suppurative otitis media of both ears without spontaneous rupture of tympanic membranes    - amoxicillin-clavulanate (AUGMENTIN) 875-125 MG Tab; Take 1 Tab by mouth 2 times a day for 7 days.  Dispense: 14 Tab; Refill: 0   - Complete full course of antibiotics as prescribed     Encouraged use of OTC nsaids or tylenol as needed for pain. Call or return to office if symptoms persist or worsen. The patient demonstrated a good understanding and agreed with the treatment plan.

## 2020-05-12 ENCOUNTER — HOSPITAL ENCOUNTER (OUTPATIENT)
Facility: MEDICAL CENTER | Age: 25
End: 2020-05-12
Attending: NURSE PRACTITIONER
Payer: COMMERCIAL

## 2020-05-12 ENCOUNTER — OFFICE VISIT (OUTPATIENT)
Dept: URGENT CARE | Facility: PHYSICIAN GROUP | Age: 25
End: 2020-05-12
Payer: COMMERCIAL

## 2020-05-12 VITALS
SYSTOLIC BLOOD PRESSURE: 120 MMHG | RESPIRATION RATE: 16 BRPM | OXYGEN SATURATION: 97 % | TEMPERATURE: 98 F | DIASTOLIC BLOOD PRESSURE: 72 MMHG | HEART RATE: 66 BPM

## 2020-05-12 DIAGNOSIS — R30.0 DYSURIA: ICD-10-CM

## 2020-05-12 DIAGNOSIS — N30.00 ACUTE CYSTITIS WITHOUT HEMATURIA: ICD-10-CM

## 2020-05-12 DIAGNOSIS — R35.0 URINARY FREQUENCY: ICD-10-CM

## 2020-05-12 LAB
APPEARANCE UR: NORMAL
BILIRUB UR STRIP-MCNC: NORMAL MG/DL
COLOR UR AUTO: YELLOW
GLUCOSE UR STRIP.AUTO-MCNC: NORMAL MG/DL
KETONES UR STRIP.AUTO-MCNC: NORMAL MG/DL
LEUKOCYTE ESTERASE UR QL STRIP.AUTO: NORMAL
NITRITE UR QL STRIP.AUTO: NORMAL
PH UR STRIP.AUTO: 5.5 [PH] (ref 5–8)
PROT UR QL STRIP: NORMAL MG/DL
RBC UR QL AUTO: NORMAL
SP GR UR STRIP.AUTO: 1.02
UROBILINOGEN UR STRIP-MCNC: 0.2 MG/DL

## 2020-05-12 PROCEDURE — 87086 URINE CULTURE/COLONY COUNT: CPT

## 2020-05-12 PROCEDURE — 99214 OFFICE O/P EST MOD 30 MIN: CPT | Performed by: NURSE PRACTITIONER

## 2020-05-12 PROCEDURE — 81002 URINALYSIS NONAUTO W/O SCOPE: CPT | Performed by: NURSE PRACTITIONER

## 2020-05-12 RX ORDER — PHENAZOPYRIDINE HYDROCHLORIDE 200 MG/1
200 TABLET, FILM COATED ORAL 3 TIMES DAILY
Qty: 6 TAB | Refills: 0 | Status: SHIPPED | OUTPATIENT
Start: 2020-05-12 | End: 2020-05-14

## 2020-05-12 RX ORDER — NITROFURANTOIN 25; 75 MG/1; MG/1
100 CAPSULE ORAL EVERY 12 HOURS
Qty: 10 CAP | Refills: 0 | Status: SHIPPED | OUTPATIENT
Start: 2020-05-12 | End: 2020-05-17

## 2020-05-12 ASSESSMENT — ENCOUNTER SYMPTOMS
FLANK PAIN: 0
NECK PAIN: 0
PALPITATIONS: 0
COUGH: 0
ABDOMINAL PAIN: 0
NAUSEA: 0
VOMITING: 0
SHORTNESS OF BREATH: 0
CHILLS: 0
FEVER: 0
FATIGUE: 0
BLURRED VISION: 0
MYALGIAS: 0
HEADACHES: 0
DOUBLE VISION: 0

## 2020-05-12 NOTE — PROGRESS NOTES
"Subjective:     Fozia Nichole is a 24 y.o. female who presents for UTI (poss uti)      UTI   This is a new problem. The current episode started in the past 7 days (3 days ago). The problem occurs constantly. The problem has been gradually improving. Associated symptoms include urinary symptoms. Pertinent negatives include no abdominal pain, chest pain, chills, coughing, fatigue, fever, headaches, myalgias, nausea, neck pain or vomiting. Nothing aggravates the symptoms. She has tried acetaminophen (AZO, increased fluids) for the symptoms. The treatment provided mild relief.   She states 3 days ago she developed painful urination that was a9/10 pain and passed a \"blood clot\" as well as gross blood in her urine. She states her symptoms have improved since this time, however, she still has dysuria, and frequency. She denies abd pain, pelvic pain or flank pain.       Review of Systems   Constitutional: Negative for chills, fatigue and fever.   HENT: Negative for hearing loss and tinnitus.    Eyes: Negative for blurred vision and double vision.   Respiratory: Negative for cough and shortness of breath.    Cardiovascular: Negative for chest pain and palpitations.   Gastrointestinal: Negative for abdominal pain, nausea and vomiting.   Genitourinary: Positive for dysuria and frequency. Negative for flank pain, hematuria and urgency.   Musculoskeletal: Negative for myalgias and neck pain.   Neurological: Negative for headaches.       PMH:   Past Medical History:   Diagnosis Date   • Migraine headache    • Ulcer      ALLERGIES: No Known Allergies  SURGHX:   Past Surgical History:   Procedure Laterality Date   • TONSILLECTOMY     • TYMPANOPLASTY       SOCHX:   Social History     Socioeconomic History   • Marital status: Single     Spouse name: Not on file   • Number of children: Not on file   • Years of education: Not on file   • Highest education level: Not on file   Occupational History   • Not on file   Social Needs "   • Financial resource strain: Not on file   • Food insecurity     Worry: Not on file     Inability: Not on file   • Transportation needs     Medical: Not on file     Non-medical: Not on file   Tobacco Use   • Smoking status: Never Smoker   • Smokeless tobacco: Never Used   Substance and Sexual Activity   • Alcohol use: Not Currently     Comment: socially    • Drug use: Yes     Types: Marijuana   • Sexual activity: Yes     Partners: Female   Lifestyle   • Physical activity     Days per week: Not on file     Minutes per session: Not on file   • Stress: Not on file   Relationships   • Social connections     Talks on phone: Not on file     Gets together: Not on file     Attends Hinduism service: Not on file     Active member of club or organization: Not on file     Attends meetings of clubs or organizations: Not on file     Relationship status: Not on file   • Intimate partner violence     Fear of current or ex partner: Not on file     Emotionally abused: Not on file     Physically abused: Not on file     Forced sexual activity: Not on file   Other Topics Concern   • Not on file   Social History Narrative   • Not on file     FH:   Family History   Problem Relation Age of Onset   • Breast Cancer Mother 52   • Cancer Maternal Grandmother         Ovarian CA    • Heart Disease Maternal Grandfather    • Thyroid Sister         Hyperthyroid    • Stroke Neg Hx          Objective:   /72 (BP Location: Left arm, Patient Position: Sitting, BP Cuff Size: Small adult)   Pulse 66   Temp 36.7 °C (98 °F) (Temporal)   Resp 16   SpO2 97%     Physical Exam  Vitals signs and nursing note reviewed.   Constitutional:       General: She is not in acute distress.     Appearance: Normal appearance. She is not ill-appearing.   HENT:      Head: Normocephalic and atraumatic.      Right Ear: External ear normal.      Left Ear: External ear normal.      Nose: Nose normal.      Mouth/Throat:      Mouth: Mucous membranes are moist.   Eyes:       Extraocular Movements: Extraocular movements intact.      Pupils: Pupils are equal, round, and reactive to light.   Neck:      Musculoskeletal: Normal range of motion and neck supple. No neck rigidity or muscular tenderness.   Cardiovascular:      Rate and Rhythm: Normal rate and regular rhythm.      Heart sounds: Normal heart sounds. No murmur.   Pulmonary:      Effort: Pulmonary effort is normal.      Breath sounds: Normal breath sounds.   Abdominal:      General: Abdomen is flat. There is no distension.      Palpations: Abdomen is soft. There is no mass.      Tenderness: There is no abdominal tenderness. There is no right CVA tenderness, left CVA tenderness or guarding.   Musculoskeletal: Normal range of motion.   Skin:     General: Skin is warm and dry.      Capillary Refill: Capillary refill takes less than 2 seconds.   Neurological:      General: No focal deficit present.      Mental Status: She is alert and oriented to person, place, and time. Mental status is at baseline.   Psychiatric:         Mood and Affect: Mood normal.         Behavior: Behavior normal.         Thought Content: Thought content normal.         Judgment: Judgment normal.     POCT U/A: luek trace    Assessment/Plan:   Assessment    1. Acute cystitis without hematuria  Urine Culture    nitrofurantoin (MACROBID) 100 MG Cap   2. Dysuria  POCT Urinalysis    Urine Culture    phenazopyridine (PYRIDIUM) 200 MG Tab   3. Urinary frequency  POCT Urinalysis    Urine Culture    phenazopyridine (PYRIDIUM) 200 MG Tab     We discussed differential diagnoses and due to her symptoms and presentation I think it is likely she passed a kidney stone. She will be empirically treated with antibiotics. Urine sent for culture. I will call her with any changes that need to be made to her medication. I will refer her to urology if culture is negative.   AVS handout given and reviewed with patient. Pt educated on red flags and when to seek treatment back in ER  or UC.

## 2020-05-15 ENCOUNTER — TELEPHONE (OUTPATIENT)
Dept: URGENT CARE | Facility: PHYSICIAN GROUP | Age: 25
End: 2020-05-15

## 2020-05-15 DIAGNOSIS — R35.0 URINARY FREQUENCY: ICD-10-CM

## 2020-05-15 DIAGNOSIS — R30.0 DYSURIA: ICD-10-CM

## 2020-05-15 LAB
BACTERIA UR CULT: NORMAL
SIGNIFICANT IND 70042: NORMAL
SITE SITE: NORMAL
SOURCE SOURCE: NORMAL

## 2020-06-06 ENCOUNTER — OFFICE VISIT (OUTPATIENT)
Dept: URGENT CARE | Facility: PHYSICIAN GROUP | Age: 25
End: 2020-06-06
Payer: COMMERCIAL

## 2020-06-06 VITALS
HEIGHT: 68 IN | DIASTOLIC BLOOD PRESSURE: 74 MMHG | WEIGHT: 200 LBS | OXYGEN SATURATION: 97 % | BODY MASS INDEX: 30.31 KG/M2 | SYSTOLIC BLOOD PRESSURE: 108 MMHG | RESPIRATION RATE: 18 BRPM | TEMPERATURE: 97.3 F | HEART RATE: 68 BPM

## 2020-06-06 DIAGNOSIS — B96.89 BACTERIAL VAGINITIS: ICD-10-CM

## 2020-06-06 DIAGNOSIS — N76.0 BACTERIAL VAGINITIS: ICD-10-CM

## 2020-06-06 PROCEDURE — 99214 OFFICE O/P EST MOD 30 MIN: CPT | Performed by: PHYSICIAN ASSISTANT

## 2020-06-06 RX ORDER — METRONIDAZOLE 500 MG/1
500 TABLET ORAL 2 TIMES DAILY
Qty: 14 TAB | Refills: 0 | Status: SHIPPED | OUTPATIENT
Start: 2020-06-06 | End: 2020-06-13

## 2020-06-06 ASSESSMENT — ENCOUNTER SYMPTOMS
COUGH: 0
FEVER: 0
FLANK PAIN: 0
DIARRHEA: 0
MYALGIAS: 0
VOMITING: 0
DIAPHORESIS: 0
HEADACHES: 0
CHILLS: 0
VAGINITIS: 1
NAUSEA: 0
ABDOMINAL PAIN: 0

## 2020-06-06 NOTE — PATIENT INSTRUCTIONS
Bacterial Vaginosis  Bacterial vaginosis is a vaginal infection that occurs when the normal balance of bacteria in the vagina is disrupted. It results from an overgrowth of certain bacteria. This is the most common vaginal infection among women ages 15-44.  Because bacterial vaginosis increases your risk for STIs (sexually transmitted infections), getting treated can help reduce your risk for chlamydia, gonorrhea, herpes, and HIV (human immunodeficiency virus). Treatment is also important for preventing complications in pregnant women, because this condition can cause an early (premature) delivery.  What are the causes?  This condition is caused by an increase in harmful bacteria that are normally present in small amounts in the vagina. However, the reason that the condition develops is not fully understood.  What increases the risk?  The following factors may make you more likely to develop this condition:  · Having a new sexual partner or multiple sexual partners.  · Having unprotected sex.  · Douching.  · Having an intrauterine device (IUD).  · Smoking.  · Drug and alcohol abuse.  · Taking certain antibiotic medicines.  · Being pregnant.  You cannot get bacterial vaginosis from toilet seats, bedding, swimming pools, or contact with objects around you.  What are the signs or symptoms?  Symptoms of this condition include:  · Grey or white vaginal discharge. The discharge can also be watery or foamy.  · A fish-like odor with discharge, especially after sexual intercourse or during menstruation.  · Itching in and around the vagina.  · Burning or pain with urination.  Some women with bacterial vaginosis have no signs or symptoms.  How is this diagnosed?  This condition is diagnosed based on:  · Your medical history.  · A physical exam of the vagina.  · Testing a sample of vaginal fluid under a microscope to look for a large amount of bad bacteria or abnormal cells. Your health care provider may use a cotton swab or a  small wooden spatula to collect the sample.  How is this treated?  This condition is treated with antibiotics. These may be given as a pill, a vaginal cream, or a medicine that is put into the vagina (suppository). If the condition comes back after treatment, a second round of antibiotics may be needed.  Follow these instructions at home:  Medicines  · Take over-the-counter and prescription medicines only as told by your health care provider.  · Take or use your antibiotic as told by your health care provider. Do not stop taking or using the antibiotic even if you start to feel better.  General instructions  · If you have a female sexual partner, tell her that you have a vaginal infection. She should see her health care provider and be treated if she has symptoms. If you have a male sexual partner, he does not need treatment.  · During treatment:  ¨ Avoid sexual activity until you finish treatment.  ¨ Do not douche.  ¨ Avoid alcohol as directed by your health care provider.  ¨ Avoid breastfeeding as directed by your health care provider.  · Drink enough water and fluids to keep your urine clear or pale yellow.  · Keep the area around your vagina and rectum clean.  ¨ Wash the area daily with warm water.  ¨ Wipe yourself from front to back after using the toilet.  · Keep all follow-up visits as told by your health care provider. This is important.  How is this prevented?  · Do not douche.  · Wash the outside of your vagina with warm water only.  · Use protection when having sex. This includes latex condoms and dental dams.  · Limit how many sexual partners you have. To help prevent bacterial vaginosis, it is best to have sex with just one partner (monogamous).  · Make sure you and your sexual partner are tested for STIs.  · Wear cotton or cotton-lined underwear.  · Avoid wearing tight pants and pantyhose, especially during summer.  · Limit the amount of alcohol that you drink.  · Do not use any products that contain  nicotine or tobacco, such as cigarettes and e-cigarettes. If you need help quitting, ask your health care provider.  · Do not use illegal drugs.  Where to find more information:  · Centers for Disease Control and Prevention: www.cdc.gov/std  · American Sexual Health Association (YOSI): www.ashastd.org  · U.S. Department of Health and Human Services, Office on Women's Health: www.womenshealth.gov/ or https://www.womenshealth.gov/a-z-topics/bacterial-vaginosis  Contact a health care provider if:  · Your symptoms do not improve, even after treatment.  · You have more discharge or pain when urinating.  · You have a fever.  · You have pain in your abdomen.  · You have pain during sex.  · You have vaginal bleeding between periods.  Summary  · Bacterial vaginosis is a vaginal infection that occurs when the normal balance of bacteria in the vagina is disrupted.  · Because bacterial vaginosis increases your risk for STIs (sexually transmitted infections), getting treated can help reduce your risk for chlamydia, gonorrhea, herpes, and HIV (human immunodeficiency virus). Treatment is also important for preventing complications in pregnant women, because the condition can cause an early (premature) delivery.  · This condition is treated with antibiotic medicines. These may be given as a pill, a vaginal cream, or a medicine that is put into the vagina (suppository).  This information is not intended to replace advice given to you by your health care provider. Make sure you discuss any questions you have with your health care provider.  Document Released: 12/18/2006 Document Revised: 09/02/2017 Document Reviewed: 09/02/2017  Elsevier Interactive Patient Education © 2017 [employer]

## 2020-06-06 NOTE — PROGRESS NOTES
Subjective:   Fozia Nichole is a 25 y.o. female who presents for Yeast Infection (unusual odor, discomfort, slight itchiness xfew days, pt states she believes it's BV, has had it before, cannot afford testing)      Vaginitis   The patient's primary symptoms include a genital odor (fishy odor) and vaginal discharge (Clear/white with fishy odor). The patient's pertinent negatives include no genital itching, genital lesions, genital rash, missed menses, pelvic pain or vaginal bleeding. This is a recurrent problem. Episode onset: 3 days. The problem has been unchanged. The patient is experiencing no pain. She is not pregnant. Pertinent negatives include no abdominal pain, chills, diarrhea, dysuria, fever, flank pain, frequency, headaches, hematuria, nausea, rash, urgency or vomiting. The vaginal discharge was clear and milky. There has been no bleeding. She has not been passing clots. She has not been passing tissue. Nothing aggravates the symptoms. She has tried nothing for the symptoms. (Frequent episodes of BV)       Review of Systems   Constitutional: Negative for chills, diaphoresis and fever.   Respiratory: Negative for cough.    Cardiovascular: Negative for chest pain.   Gastrointestinal: Negative for abdominal pain, diarrhea, nausea and vomiting.   Genitourinary: Positive for vaginal discharge (Clear/white with fishy odor). Negative for dysuria, flank pain, frequency, hematuria, missed menses, pelvic pain and urgency.        Fish odor with thin whitish discharge consistent with prior episodes of BV. No rash, no pain.   Musculoskeletal: Negative for myalgias.   Skin: Negative for rash.   Neurological: Negative for headaches.       Medications:    • fluticasone  • Norgestim-Eth Estrad Triphasic Tabs  • omeprazole  • oseltamivir Caps    Allergies: Patient has no known allergies.    Problem List: Fozia Nichole has Migraine without aura; Acne vulgaris; and PTSD (post-traumatic stress disorder) on  "their problem list.    Surgical History:  Past Surgical History:   Procedure Laterality Date   • TONSILLECTOMY     • TYMPANOPLASTY         Past Social Hx: Fozia Nichole  reports that she has never smoked. She has never used smokeless tobacco. She reports previous alcohol use. She reports current drug use. Drug: Marijuana.     Past Family Hx:  Fozia Nichole family history includes Breast Cancer (age of onset: 52) in her mother; Cancer in her maternal grandmother; Heart Disease in her maternal grandfather; Thyroid in her sister.     Problem list, medications, and allergies reviewed by myself today in Epic.     Objective:     /74 (BP Location: Left arm, Patient Position: Sitting, BP Cuff Size: Adult)   Pulse 68   Temp 36.3 °C (97.3 °F) (Temporal)   Resp 18   Ht 1.727 m (5' 8\")   Wt 90.7 kg (200 lb)   SpO2 97%   BMI 30.41 kg/m²     Physical Exam  Constitutional:       General: She is not in acute distress.     Appearance: Normal appearance. She is not ill-appearing, toxic-appearing or diaphoretic.   HENT:      Head: Normocephalic and atraumatic.      Nose: Nose normal. No congestion or rhinorrhea.      Mouth/Throat:      Mouth: Mucous membranes are moist.      Pharynx: No oropharyngeal exudate or posterior oropharyngeal erythema.   Eyes:      Conjunctiva/sclera: Conjunctivae normal.   Neck:      Musculoskeletal: Normal range of motion. No muscular tenderness.   Cardiovascular:      Rate and Rhythm: Normal rate and regular rhythm.      Pulses: Normal pulses.      Heart sounds: Normal heart sounds.   Pulmonary:      Effort: Pulmonary effort is normal.      Breath sounds: Normal breath sounds. No wheezing.   Abdominal:      General: Bowel sounds are normal.      Palpations: Abdomen is soft.      Tenderness: There is no abdominal tenderness. There is no right CVA tenderness, left CVA tenderness, guarding or rebound.   Lymphadenopathy:      Cervical: No cervical adenopathy.   Skin:     General: " Skin is warm and dry.      Capillary Refill: Capillary refill takes less than 2 seconds.   Neurological:      Mental Status: She is alert.   Psychiatric:         Mood and Affect: Mood normal.         Thought Content: Thought content normal.           Assessment/Plan:     Diagnosis and associated orders:     1. Bacterial vaginitis    - metroNIDAZOLE (FLAGYL) 500 MG Tab; Take 1 Tab by mouth 2 Times a Day for 7 days.  Dispense: 14 Tab; Refill: 0     Comments/MDM:       • Differential diagnoses and treatment options discussed with the patient at length today.  Patient explains is consistent with her prior episodes of bacterial vaginosis.  She denies any recent antibiotic use.  Currently her symptoms of been whitish discharge and fishy odor consistent with bacterial vaginosis.  She denies any thick discharge or severe itching.  She has no other vaginal discharge or pain.  At this time the patient states she cannot afford to be tested for bacterial vaginosis or candidiasis.  If possible she wishes to just be treated.  We agreed to treat with metronidazole but encouraged the patient to closely monitor symptoms and follow-up if any change or worsening of symptoms.  • Do not consume alcohol while taking metronidazole.  Can cause a severe disulfiram-like reaction.  This was discussed at length with the patient and she agreed to this.               Differential diagnosis, natural history, supportive care, and indications for immediate follow-up discussed.    Advised the patient to follow-up with the primary care physician for recheck, reevaluation, and consideration of further management.    Please note that this dictation was created using voice recognition software. I have made reasonable attempt to correct obvious errors, but I expect that there are errors of grammar and possibly content that I did not discover before finalizing the note.    This note was electronically signed by GRZEGORZ Harper PA-C

## 2020-06-18 ENCOUNTER — TELEPHONE (OUTPATIENT)
Dept: HEALTH INFORMATION MANAGEMENT | Facility: OTHER | Age: 25
End: 2020-06-18

## 2020-06-18 NOTE — TELEPHONE ENCOUNTER
1. Caller Name: Fozia Nichole                Call Back Number: [phone]  [facility] PCP or Specialty Provider: Yes Mandy GRANDE        2.  In the last two weeks, has the patient had any new or worsening symptoms (not explained by alternative diagnosis)? No. cough, SOB, nasal congestion/runny nose. Patient has asthma and seasonal allergies, the above symptoms are her baseline however patient states she has had increasing asthma symptoms the past 2-3 years.    3.  Does patient have any comoribidities? None     4.  Has the patient traveled in the last 14 days OR had any known contact with someone who is suspected or confirmed to have COVID-19?  No.    5. Disposition: Cleared by RN Triage as potential is low for COVID-19; OK to keep/schedule appointment    Note routed to [facility] Provider: RAYSHAWN only.

## 2020-06-19 ENCOUNTER — APPOINTMENT (OUTPATIENT)
Dept: MEDICAL GROUP | Facility: PHYSICIAN GROUP | Age: 25
End: 2020-06-19
Payer: COMMERCIAL

## 2020-06-24 ENCOUNTER — TELEMEDICINE (OUTPATIENT)
Dept: TELEHEALTH | Facility: TELEMEDICINE | Age: 25
End: 2020-06-24
Payer: COMMERCIAL

## 2020-06-24 DIAGNOSIS — J45.20 MILD INTERMITTENT ASTHMA, UNSPECIFIED WHETHER COMPLICATED: ICD-10-CM

## 2020-06-24 PROCEDURE — 99213 OFFICE O/P EST LOW 20 MIN: CPT | Mod: 95,CR | Performed by: PHYSICIAN ASSISTANT

## 2020-06-24 RX ORDER — ALBUTEROL SULFATE 90 UG/1
2 AEROSOL, METERED RESPIRATORY (INHALATION) EVERY 6 HOURS PRN
Qty: 1 INHALER | Refills: 3 | Status: SHIPPED
Start: 2020-06-24 | End: 2021-04-13

## 2020-06-24 NOTE — PROGRESS NOTES
Telemedicine Visit: Established Patient     This encounter was conducted via Zoom .   Verbal consent was obtained. Patient's identity was verified.    Subjective:   CC: Asthma  Fozia Nichole is a 25 y.o. female presenting for evaluation and management of:    Asthma    ROS   Denies any recent fevers or chills. No nausea or vomiting. No chest pains.  shortness of breath/wheezing occasionally.     No Known Allergies    Current medicines (including changes today)  Current Outpatient Medications   Medication Sig Dispense Refill   • albuterol (PROAIR HFA) 108 (90 Base) MCG/ACT Aero Soln inhalation aerosol Inhale 2 Puffs by mouth every 6 hours as needed for Shortness of Breath. 1 Inhaler 3   • oseltamivir (TAMIFLU) 75 MG Cap Take 1 Cap by mouth 2 times a day. (Patient not taking: Reported on 5/12/2020) 10 Cap 0   • Norgestim-Eth Estrad Triphasic 0.18/0.215/0.25 MG-25 MCG Tab Take 1 Each by mouth every day. (Patient not taking: Reported on 3/4/2020) 90 Tab 0   • omeprazole (PRILOSEC) 20 MG delayed-release capsule Take 1 Cap by mouth every day. (Patient not taking: Reported on 9/23/2019) 30 Cap 0   • fluticasone (FLONASE) 50 MCG/ACT nasal spray Spray 2 Sprays in nose every day. (Patient not taking: Reported on 9/23/2019) 16 g 0     No current facility-administered medications for this visit.        Patient Active Problem List    Diagnosis Date Noted   • PTSD (post-traumatic stress disorder) 07/16/2019   • Acne vulgaris 01/22/2019   • Migraine without aura 01/21/2014       Family History   Problem Relation Age of Onset   • Breast Cancer Mother 52   • Cancer Maternal Grandmother         Ovarian CA    • Heart Disease Maternal Grandfather    • Thyroid Sister         Hyperthyroid    • Stroke Neg Hx        She  has a past medical history of Migraine headache and Ulcer. She also has no past medical history of Asthma, Cancer (HCC), Diabetes (HCC), Hyperlipidemia, or Hypertension.  She  has a past surgical history that  includes tympanoplasty and tonsillectomy.       Objective:   There were no vitals taken for this visit.    Physical Exam:  Constitutional: Alert, no distress, well-groomed.  Skin: No rashes in visible areas.  Eye: Round. Conjunctiva clear, lids normal. No icterus.   ENMT: Lips pink without lesions, good dentition, moist mucous membranes. Phonation normal.  Neck: No masses, no thyromegaly. Moves freely without pain.  CV: Pulse as reported by patient  Respiratory: Unlabored respiratory effort, no cough or audible wheeze  Psych: Alert and oriented x3, normal affect and mood.       Assessment and Plan:   The following treatment plan was discussed:     1. Mild intermittent asthma, unspecified whether complicated  - albuterol (PROAIR HFA) 108 (90 Base) MCG/ACT Aero Soln inhalation aerosol; Inhale 2 Puffs by mouth every 6 hours as needed for Shortness of Breath.  Dispense: 1 Inhaler; Refill: 3        Follow-up: No follow-ups on file.

## 2020-10-29 ENCOUNTER — GYNECOLOGY VISIT (OUTPATIENT)
Dept: OBGYN | Facility: CLINIC | Age: 25
End: 2020-10-29
Payer: COMMERCIAL

## 2020-10-29 VITALS — BODY MASS INDEX: 26.46 KG/M2 | SYSTOLIC BLOOD PRESSURE: 137 MMHG | WEIGHT: 174 LBS | DIASTOLIC BLOOD PRESSURE: 84 MMHG

## 2020-10-29 DIAGNOSIS — N92.6 IRREGULAR MENSTRUAL CYCLE: ICD-10-CM

## 2020-10-29 DIAGNOSIS — Z00.00 ANNUAL PHYSICAL EXAM: ICD-10-CM

## 2020-10-29 PROCEDURE — 99395 PREV VISIT EST AGE 18-39: CPT | Performed by: OBSTETRICS & GYNECOLOGY

## 2020-10-29 NOTE — NON-PROVIDER
Patient here for annual exam  Last pap done/result: 08/14/2018, negative  LMP: September  BCM: not interested.  Phone number: [phone]  Pharmacy verified  C/o hasn't had a period since the September, and before that hasn't had one since the beginning of the year.

## 2020-10-29 NOTE — PROGRESS NOTES
"Fozia Nichole is a 25 y.o. No obstetric history on file. female who presents for her Annual Gynecologic Exam      HPI Comments: Pt presents for her annual well woman exam.   Patient doing well overall, but reports she has only had 1 cycle of this year in September.  She reports that she and her partner bought some sexual enhancement products named (\"kinky rg\" initially began to have her menstrual cycle today after.  Her prior cycle to this was in November or December 2019.  She had been on contraception up to that point, she has only had 1 cycle since she stopped her birth control pills.      She does report a lifelong history of irregular cycles in the past.    Review of Systems:   Pertinent positives documented in HPI and all other systems reviewed & are negative    PGYN Hx: Irregular cycles, currently in a same-sex relationship    All PMH, PSH, allergies, social history and FH reviewed and updated today:  Past Medical History:   Diagnosis Date   • Migraine headache    • Ulcer        Past Surgical History:   Procedure Laterality Date   • TONSILLECTOMY     • TYMPANOPLASTY         Medications:   Current Outpatient Medications Ordered in Epic   Medication Sig Dispense Refill   • albuterol (PROAIR HFA) 108 (90 Base) MCG/ACT Aero Soln inhalation aerosol Inhale 2 Puffs by mouth every 6 hours as needed for Shortness of Breath. 1 Inhaler 3   • oseltamivir (TAMIFLU) 75 MG Cap Take 1 Cap by mouth 2 times a day. (Patient not taking: Reported on 5/12/2020) 10 Cap 0   • Norgestim-Eth Estrad Triphasic 0.18/0.215/0.25 MG-25 MCG Tab Take 1 Each by mouth every day. (Patient not taking: Reported on 3/4/2020) 90 Tab 0   • omeprazole (PRILOSEC) 20 MG delayed-release capsule Take 1 Cap by mouth every day. (Patient not taking: Reported on 9/23/2019) 30 Cap 0   • fluticasone (FLONASE) 50 MCG/ACT nasal spray Spray 2 Sprays in nose every day. (Patient not taking: Reported on 9/23/2019) 16 g 0     No current Epic-ordered " facility-administered medications on file.        Patient has no known allergies.    Social History     Socioeconomic History   • Marital status: Single     Spouse name: Not on file   • Number of children: Not on file   • Years of education: Not on file   • Highest education level: Not on file   Occupational History   • Not on file   Social Needs   • Financial resource strain: Not on file   • Food insecurity     Worry: Not on file     Inability: Not on file   • Transportation needs     Medical: Not on file     Non-medical: Not on file   Tobacco Use   • Smoking status: Never Smoker   • Smokeless tobacco: Never Used   Substance and Sexual Activity   • Alcohol use: Not Currently     Comment: socially    • Drug use: Yes     Types: Marijuana   • Sexual activity: Yes     Partners: Female   Lifestyle   • Physical activity     Days per week: Not on file     Minutes per session: Not on file   • Stress: Not on file   Relationships   • Social connections     Talks on phone: Not on file     Gets together: Not on file     Attends [employer] service: Not on file     Active member of club or organization: Not on file     Attends meetings of clubs or organizations: Not on file     Relationship status: Not on file   • Intimate partner violence     Fear of current or ex partner: Not on file     Emotionally abused: Not on file     Physically abused: Not on file     Forced sexual activity: Not on file   Other Topics Concern   • Not on file   Social History Narrative   • Not on file       Family History   Problem Relation Age of Onset   • Breast Cancer Mother 52   • Cancer Maternal Grandmother         Ovarian CA    • Heart Disease Maternal Grandfather    • Thyroid Sister         Hyperthyroid    • Stroke Neg Hx           Objective:   Vital measurements:  /84 (BP Location: Right arm, Patient Position: Sitting)   Wt 78.9 kg (174 lb)   Body mass index is 26.46 kg/m². (Goal BM I>18 <25)    Physical Exam   Nursing note and vitals  reviewed.  Constitutional: She is oriented to person, place, and time. She appears well-developed and well-nourished. No distress.     HEENT:   Head: Normocephalic and atraumatic.   Right Ear: External ear normal.   Left Ear: External ear normal.   Nose: Nose normal.   Eyes: Conjunctivae and EOM are normal. Pupils are equal, round, and reactive to light. No scleral icterus.     Neck: Normal range of motion. Neck supple. No tracheal deviation present. No thyromegaly present. No cervical or supraclavicular lymphadenopathy.    Pulmonary/Chest: Effort normal and breath sounds normal. No respiratory distress. She has no wheezes. She has no rales. She exhibits no tenderness.     Cardiovascular: Regular, rate and rhythm. No edema.    Breast: Symmetrical, normal consistency without masses., No dimpling or skin changes, Normal nipples without discharge, no axillary lymphadenopathy    Abdominal: Soft. Bowel sounds are normal. She exhibits no distension and no mass. No tenderness. She has no rebound and no guarding.     Genitourinary:  Pelvic exam was performed with patient supine.  External genitalia with no abnormal pigmentation, labial fusion, rashes, tenderness, lesions or injury to the labia bilaterally.  BUS normal  Vagina is pink and moist with no lesions, foul discharge, erythema, tenderness or bleeding. No foreign body around the vagina or signs of injury.   Cervix exhibits no motion tenderness, no discharge and no friability, no lesions.   Uterus is 7 cm and not deviated, not enlarged, not fixed and not tender.  Right adnexa displays no mass, no tenderness and no fullness.  Left adnexa displays no mass, no tenderness and no fullness.     Musculoskeletal: Normal range of motion. Non tender. She exhibits no edema and no tenderness.     Lymphadenopathy: She has no cervical or supraclavicular adenopathy.     Neurological: She is alert and oriented to person, place, and time. She exhibits normal muscle tone.     Skin:  Skin is warm and dry. No rash noted. She is not diaphoretic. No erythema. No pallor.     Psychiatric: She has a normal mood and affect. Her behavior is normal. Judgment and thought content normal.        Assessment:     1. Irregular menstrual cycle  TESTOSTERONE F&T FEMALES/CHILD    US-PELVIC COMPLETE (TRANSABDOMINAL/TRANSVAGINAL) (COMBO)    TSH    FREE THYROXINE   2. Annual physical exam           Plan:   Physical exam performed.  Pap smear up-to-date.  Self breast awareness discussed.  Encouraged exercise and proper diet.  Mammograms starting @ age 40 annually.  See medications and orders placed in encounter report.  Follow up in 1 year for annual exam or sooner as needed    As far as the amenorrhea is concerned, high degree of suspicion for possible PCOS given her longstanding history of abnormal and irregular cycles and only a single menstrual cycle this year.    Pelvic ultrasound in laboratory evaluation ordered.  Patient follow-up in approximately 1 to 2 weeks to discuss lab and ultrasound results.  Plan of care to follow, dependent on findings    All questions answered

## 2020-11-09 LAB
T4 FREE SERPL-MCNC: 2.02 NG/DL (ref 0.82–1.77)
TESTOST FREE SERPL-MCNC: 6 PG/ML (ref 0–4.2)
TESTOST SERPL-MCNC: 112.1 NG/DL (ref 10–55)
TSH SERPL DL<=0.005 MIU/L-ACNC: <0.005 UIU/ML (ref 0.45–4.5)

## 2020-11-10 ENCOUNTER — HOSPITAL ENCOUNTER (OUTPATIENT)
Dept: RADIOLOGY | Facility: MEDICAL CENTER | Age: 25
End: 2020-11-10
Attending: OBSTETRICS & GYNECOLOGY
Payer: COMMERCIAL

## 2020-11-10 DIAGNOSIS — N92.6 IRREGULAR MENSTRUAL CYCLE: ICD-10-CM

## 2020-11-10 PROCEDURE — 76830 TRANSVAGINAL US NON-OB: CPT

## 2020-11-11 ENCOUNTER — GYNECOLOGY VISIT (OUTPATIENT)
Dept: OBGYN | Facility: CLINIC | Age: 25
End: 2020-11-11
Payer: COMMERCIAL

## 2020-11-11 VITALS — WEIGHT: 169 LBS | DIASTOLIC BLOOD PRESSURE: 63 MMHG | BODY MASS INDEX: 25.7 KG/M2 | SYSTOLIC BLOOD PRESSURE: 119 MMHG

## 2020-11-11 DIAGNOSIS — R79.89 ELEVATED TESTOSTERONE LEVEL: ICD-10-CM

## 2020-11-11 DIAGNOSIS — R79.89 LOW THYROID STIMULATING HORMONE (TSH) LEVEL: ICD-10-CM

## 2020-11-11 DIAGNOSIS — E28.2 PCOS (POLYCYSTIC OVARIAN SYNDROME): ICD-10-CM

## 2020-11-11 PROCEDURE — 99213 OFFICE O/P EST LOW 20 MIN: CPT | Performed by: OBSTETRICS & GYNECOLOGY

## 2020-11-11 RX ORDER — SPIRONOLACTONE 25 MG/1
25 TABLET ORAL DAILY
Qty: 30 TAB | Refills: 3 | Status: SHIPPED
Start: 2020-11-11 | End: 2021-10-22

## 2020-11-11 NOTE — PROGRESS NOTES
Chief Complaint   Patient presents with   • Gynecologic Exam     test results     Chief complaint: Oligomenorrhea.    History of present illness: 25 y.o. presents to office for follow-up.  Patient had ultrasound as well as laboratory studies done.  She resents here for follow-up.  No new questions.      Review of systems:  Pertinent positives documented in HPI and a comprehensive review of system is negative    All PMH, PSH, allergies, social history and FH reviewed and updated today:  Past Medical History:   Diagnosis Date   • Migraine headache    • Ulcer        Past Surgical History:   Procedure Laterality Date   • TONSILLECTOMY     • TYMPANOPLASTY         Allergies: No Known Allergies    Social History     Socioeconomic History   • Marital status: Single     Spouse name: Not on file   • Number of children: Not on file   • Years of education: Not on file   • Highest education level: Not on file   Occupational History   • Not on file   Social Needs   • Financial resource strain: Not on file   • Food insecurity     Worry: Not on file     Inability: Not on file   • Transportation needs     Medical: Not on file     Non-medical: Not on file   Tobacco Use   • Smoking status: Never Smoker   • Smokeless tobacco: Never Used   Substance and Sexual Activity   • Alcohol use: Not Currently     Comment: socially    • Drug use: Yes     Types: Marijuana   • Sexual activity: Yes     Partners: Female   Lifestyle   • Physical activity     Days per week: Not on file     Minutes per session: Not on file   • Stress: Not on file   Relationships   • Social connections     Talks on phone: Not on file     Gets together: Not on file     Attends Pentecostalism service: Not on file     Active member of club or organization: Not on file     Attends meetings of clubs or organizations: Not on file     Relationship status: Not on file   • Intimate partner violence     Fear of current or ex partner: Not on file     Emotionally abused: Not on file      Physically abused: Not on file     Forced sexual activity: Not on file   Other Topics Concern   • Not on file   Social History Narrative   • Not on file       Family History   Problem Relation Age of Onset   • Breast Cancer Mother 52   • Cancer Maternal Grandmother         Ovarian CA    • Heart Disease Maternal Grandfather    • Thyroid Sister         Hyperthyroid    • Stroke Neg Hx        Physical exam:  /63   Wt 76.7 kg (169 lb)     GENERAL APPEARANCE: healthy, alert, no distress  NEURO Awake, alert and oriented x 3, Normal gait, no sensory deficits  SKIN No rashes, or ulcers or lesions seen  PSYCHIATRIC: Patient shows appropriate affect, is alert and oriented x3, intact judgment and insight.      Assessment:  1. Low thyroid stimulating hormone (TSH) level  REFERRAL TO ENDOCRINOLOGY   2. PCOS (polycystic ovarian syndrome)     3. Elevated testosterone level         Plan:    Discuss laboratory data and ultrasound dated with patient.  There is ultrasound evidence of polycystic ovaries as well as elevated testosterone levels noted.  This is consistent with PCOS.  This was discussed with the patient.    Given her elevated testosterone levels, will begin treatment with spironolactone.  Side effects of spironolactone discussed the patient including the fact that this is a testosterone blocker and contraindicated in pregnancy.  Patient is only sexually active with other women and is aware that this is a contraindicated medication in pregnancy.    We will follow-up in a month to assess potassium levels as well as testosterone levels.    Elevated free thyroxine levels with low TSH levels, will refer to endocrinology for evaluation.  .   Questions answered    Spent  15 minutes in face-to-face patient contact in which greater than 50% of that visit was spent in counseling/coordination of care including medical and surgical options of care.

## 2021-01-11 ENCOUNTER — TELEPHONE (OUTPATIENT)
Dept: OBGYN | Facility: CLINIC | Age: 26
End: 2021-01-11

## 2021-01-11 NOTE — TELEPHONE ENCOUNTER
Pt LM on VM stating she is having some issues and wants a call back  Called pt back, unable to reach her. LMTCB

## 2021-01-12 ENCOUNTER — TELEPHONE (OUTPATIENT)
Dept: OBGYN | Facility: CLINIC | Age: 26
End: 2021-01-12

## 2021-01-12 ENCOUNTER — OFFICE VISIT (OUTPATIENT)
Dept: URGENT CARE | Facility: CLINIC | Age: 26
End: 2021-01-12
Payer: COMMERCIAL

## 2021-01-12 VITALS
HEIGHT: 67 IN | HEART RATE: 71 BPM | SYSTOLIC BLOOD PRESSURE: 124 MMHG | TEMPERATURE: 96.8 F | WEIGHT: 167 LBS | DIASTOLIC BLOOD PRESSURE: 78 MMHG | BODY MASS INDEX: 26.21 KG/M2 | OXYGEN SATURATION: 97 % | RESPIRATION RATE: 18 BRPM

## 2021-01-12 DIAGNOSIS — A60.04 HERPES SIMPLEX VULVOVAGINITIS: ICD-10-CM

## 2021-01-12 PROCEDURE — 99213 OFFICE O/P EST LOW 20 MIN: CPT | Performed by: FAMILY MEDICINE

## 2021-01-12 RX ORDER — VALACYCLOVIR HYDROCHLORIDE 1 G/1
1000 TABLET, FILM COATED ORAL 2 TIMES DAILY
Qty: 14 TAB | Refills: 0 | Status: SHIPPED | OUTPATIENT
Start: 2021-01-12 | End: 2021-01-19

## 2021-01-12 ASSESSMENT — ENCOUNTER SYMPTOMS
NAUSEA: 0
WEIGHT LOSS: 0
EYE REDNESS: 0
MYALGIAS: 0
EYE DISCHARGE: 0
VOMITING: 0

## 2021-01-12 NOTE — TELEPHONE ENCOUNTER
Returned pt's call and she stated she has a herpes flair up and was wondering if maybe this was due to the new med she is taking (spironolactone) between this and now her having her period she was wondering if they both contributed with the outbreak.    Pt has appt scheduled w/[facility] today but was advised to also follow up with us in regards to this. She made an appt for 2/1/21 but will call daily to see if she can get in sooner.

## 2021-01-13 NOTE — PROGRESS NOTES
"Subjective:      Fozia Nichole is a 25 y.o. female who presents with Herpes Zoster (possible herpes outbreak , swelling and very painful x 3 days  )            3 days initial pain right labia.  Now with vesicular rash.  PMH HSV 1 from genital region approximately 3 years ago.  She has not had another outbreak since then.  No clear trigger or trauma.  No vaginal discharge.  No dysuria.  No hematuria.  No fever.  No other aggravating or alleviating factors.      Review of Systems   Constitutional: Negative for malaise/fatigue and weight loss.   Eyes: Negative for discharge and redness.   Gastrointestinal: Negative for nausea and vomiting.   Musculoskeletal: Negative for joint pain and myalgias.   Skin: Negative for itching and rash.     .  Medications, Allergies, and current problem list reviewed today in Epic       Objective:     /78 (BP Location: Left arm, Patient Position: Sitting, BP Cuff Size: Adult)   Pulse 71   Temp 36 °C (96.8 °F) (Temporal)   Resp 18   Ht 1.702 m (5' 7\")   Wt 75.8 kg (167 lb)   SpO2 97%   BMI 26.16 kg/m²      Physical Exam  Constitutional:       General: She is not in acute distress.     Appearance: She is well-developed.   HENT:      Head: Normocephalic and atraumatic.   Eyes:      Conjunctiva/sclera: Conjunctivae normal.   Genitourinary:      Skin:     General: Skin is warm and dry.      Findings: No rash.   Neurological:      Mental Status: She is alert and oriented to person, place, and time.                 Assessment/Plan:       HSV PCR from 2018 reviewed    1. Herpes simplex vulvovaginitis  valacyclovir (VALTREX) 1 GM Tab     Differential diagnosis, natural history, supportive care, and indications for immediate follow-up discussed at length.       "

## 2021-02-01 ENCOUNTER — GYNECOLOGY VISIT (OUTPATIENT)
Dept: OBGYN | Facility: CLINIC | Age: 26
End: 2021-02-01
Payer: COMMERCIAL

## 2021-02-01 VITALS — BODY MASS INDEX: 26.31 KG/M2 | WEIGHT: 168 LBS | SYSTOLIC BLOOD PRESSURE: 132 MMHG | DIASTOLIC BLOOD PRESSURE: 77 MMHG

## 2021-02-01 DIAGNOSIS — R79.89 ELEVATED TESTOSTERONE LEVEL: ICD-10-CM

## 2021-02-01 DIAGNOSIS — E28.2 PCOS (POLYCYSTIC OVARIAN SYNDROME): ICD-10-CM

## 2021-02-01 PROCEDURE — 99213 OFFICE O/P EST LOW 20 MIN: CPT | Performed by: OBSTETRICS & GYNECOLOGY

## 2021-02-01 NOTE — NON-PROVIDER
Pt here to discuss meds   Pt states she thinks her herpes outbreak could be because her new meds   Good # [phone]   Pharmacy verified.

## 2021-02-01 NOTE — PROGRESS NOTES
Chief complaint: Return visit      History of present illness: 25 y.o. presents to office for discussion of her herpes outbreak and irregular cycle.  Patient reports he had a very heavy and painful cycle at the end of last year.  This began on February 16 and lessened to January 1.  It was very heavy with significant cramping and large clots.  This is her first cycle in 1 year.    Patient is currently on spironolactone due to PCOS with elevated testosterone.    Has finished Valtrex treatment.  Her prior outbreak was over 3 years ago    Review of systems:  Pertinent positives documented in HPI and a comprehensive review of system is negative    All PMH, PSH, allergies, social history and FH reviewed and updated today:  Past Medical History:   Diagnosis Date   • Migraine headache    • Ulcer        Past Surgical History:   Procedure Laterality Date   • TONSILLECTOMY     • TYMPANOPLASTY         Allergies: No Known Allergies    Social History     Socioeconomic History   • Marital status: Single     Spouse name: Not on file   • Number of children: Not on file   • Years of education: Not on file   • Highest education level: Not on file   Occupational History   • Not on file   Social Needs   • Financial resource strain: Not on file   • Food insecurity     Worry: Not on file     Inability: Not on file   • Transportation needs     Medical: Not on file     Non-medical: Not on file   Tobacco Use   • Smoking status: Never Smoker   • Smokeless tobacco: Never Used   Substance and Sexual Activity   • Alcohol use: Not Currently     Comment: socially    • Drug use: Yes     Types: Marijuana   • Sexual activity: Yes     Partners: Female   Lifestyle   • Physical activity     Days per week: Not on file     Minutes per session: Not on file   • Stress: Not on file   Relationships   • Social connections     Talks on phone: Not on file     Gets together: Not on file     Attends Hindu service: Not on file     Active member of club or  organization: Not on file     Attends meetings of clubs or organizations: Not on file     Relationship status: Not on file   • Intimate partner violence     Fear of current or ex partner: Not on file     Emotionally abused: Not on file     Physically abused: Not on file     Forced sexual activity: Not on file   Other Topics Concern   • Not on file   Social History Narrative   • Not on file       Family History   Problem Relation Age of Onset   • Breast Cancer Mother 52   • Cancer Maternal Grandmother         Ovarian CA    • Heart Disease Maternal Grandfather    • Thyroid Sister         Hyperthyroid    • Stroke Neg Hx        Physical exam:  /77   Wt 76.2 kg (168 lb)     GENERAL APPEARANCE: healthy, alert, no distress  ABDOMEN Abdomen soft, non-tender. BS normal. No masses,  No organomegaly  FEMALE GYN: normal female external genitalia without lesions, BUS normal, no vaginal discharge noted, vulva pink without erythema or friability, urethra is normal without discharge or scarring, no bladder fullness or masses, normal external genitalia, no erythema, no discharge, normal anus and perineum.  No signs of HSV outbreak  NEURO Awake, alert and oriented x 3, Normal gait, no sensory deficits  SKIN No rashes, or ulcers or lesions seen  PSYCHIATRIC: Patient shows appropriate affect, is alert and oriented x3, intact judgment and insight.      Assessment:  1. Elevated testosterone level     2. PCOS (polycystic ovarian syndrome)         Plan:    Patient follow-up in approximately 2 months.  We will recheck testosterone levels at that time.    Patient also has appointment with endocrinology later this month for evaluation of her thyroid.    Patient continues to have significant outbreaks, may need daily prophylaxis      Questions answered    Spent  15 minutes in face-to-face patient contact in which greater than 50% of that visit was spent in counseling/coordination of care including medical and surgical options of care.

## 2021-02-23 ENCOUNTER — TELEMEDICINE (OUTPATIENT)
Dept: ENDOCRINOLOGY | Facility: MEDICAL CENTER | Age: 26
End: 2021-02-23
Attending: NURSE PRACTITIONER
Payer: COMMERCIAL

## 2021-02-23 VITALS — BODY MASS INDEX: 25.58 KG/M2 | HEIGHT: 67 IN | WEIGHT: 163 LBS

## 2021-02-23 DIAGNOSIS — R94.6 THYROID FUNCTION TEST ABNORMAL: ICD-10-CM

## 2021-02-23 DIAGNOSIS — E28.2 PCOS (POLYCYSTIC OVARIAN SYNDROME): ICD-10-CM

## 2021-02-23 DIAGNOSIS — R79.89 ELEVATED TESTOSTERONE LEVEL IN FEMALE: ICD-10-CM

## 2021-02-23 PROCEDURE — 99214 OFFICE O/P EST MOD 30 MIN: CPT | Performed by: NURSE PRACTITIONER

## 2021-02-23 NOTE — PROGRESS NOTES
Chief Complaint: Consult requested by Pcp Not In Computer for evaluation of Hyperthyroidism  Referred by Dr. Roseann Bradford.   Patient was presented for a telehealth consultation via secure and encrypted videoconferencing technology. This encounter was conducted via Zoom . Verbal consent was obtained. Patient's identity was verified.  HPI:     Fozia Nichole is a 25 y.o. female for initial consult to discuss suppressed thyroid levels.  She reports the following symptoms: pt states she has no palpitations, tremors, heat intolerance or unexpected weight loss. Pt denies lumps or enlargement in the neck, denies tenderness.    She reports a family history of hypothyroidism, younger sister since her early 20's Unsure if it's Hashimoto's Thyroiditis.  She denies taking thyroid hormone or biotin.  Pt has been taking Emergen C Daily for weeks.  She denies any recent IV contrast exposure.     She denies any recent URI or having neck pain.    Hx of PCOS.   Spironolactone 25mg, started in Novemeber,  this last December had a menstraul cycle  that lasted 3 weeks. May go on contraception. Pt is seeing OB/GYN  Hx of diabetes with maternal Grandfather.     Thyroid Antibodies not tested.     Ref. Range 11/3/2020 13:31   TSH Latest Ref Range: 0.450 - 4.500 uIU/mL <0.005 (L)   Free T-4 Latest Ref Range: 0.82 - 1.77 ng/dL 2.02 (H)      Ref. Range 11/3/2020 13:31   Free Testosterone Latest Ref Range: 0.0 - 4.2 pg/mL 6.0 (H)   Testosterone, Total LC/MS Latest Ref Range: 10.0 - 55.0 ng/dL 112.1 (H)       Patient's medications, allergies, and social histories were reviewed and updated as appropriate.      ROS:     CONS:     No fever, no chills, no weight loss, no fatigue   EYES:      No diplopia, no blurry vision, no redness of eyes, no swelling of eyelids   ENT:    No hearing loss, No ear pain, No sore throat, no dysphagia, no neck swelling   CV:     No chest pain, no palpitations, no claudication, no orthopnea, no PND   PULM:     No SOB, no cough, no hemoptysis, no wheezing    GI:   No nausea, no vomiting, no diarrhea, no constipation, no bloody stools   :  Passing urine well, no dysuria, no hematuria   ENDO:   No polyuria, no polydipsia, no heat intolerance, no cold intolerance   NEURO: No headaches, no dizziness, no convulsions, no tremors   MUSC:  No joint swellings, no arthralgias, no myalgias, no weakness   SKIN:   No rash, no ulcers, no dry skin   PSYCH:   No depression, no anxiety, no difficulty sleeping       Past Medical History:  Patient Active Problem List    Diagnosis Date Noted   • PCOS (polycystic ovarian syndrome) 11/11/2020   • Elevated testosterone level 11/11/2020   • Annual physical exam 10/29/2020   • PTSD (post-traumatic stress disorder) 07/16/2019   • Acne vulgaris 01/22/2019   • Migraine without aura 01/21/2014       Past Surgical History:  Past Surgical History:   Procedure Laterality Date   • TONSILLECTOMY     • TYMPANOPLASTY          Allergies:  Patient has no known allergies.     Current Medications:    Current Outpatient Medications:   •  Multiple Vitamin (MULTIVITAMIN ADULT PO), Take  by mouth., Disp: , Rfl:   •  spironolactone (ALDACTONE) 25 MG Tab, Take 1 Tab by mouth every day., Disp: 30 Tab, Rfl: 3  •  albuterol (PROAIR HFA) 108 (90 Base) MCG/ACT Aero Soln inhalation aerosol, Inhale 2 Puffs by mouth every 6 hours as needed for Shortness of Breath., Disp: 1 Inhaler, Rfl: 3    Social History:  Social History     Socioeconomic History   • Marital status: Single     Spouse name: Not on file   • Number of children: Not on file   • Years of education: Not on file   • Highest education level: Not on file   Occupational History   • Not on file   Tobacco Use   • Smoking status: Never Smoker   • Smokeless tobacco: Never Used   Substance and Sexual Activity   • Alcohol use: Not Currently     Comment: socially    • Drug use: Yes     Types: Marijuana   • Sexual activity: Yes     Partners: Female   Other Topics Concern    • Not on file   Social History Narrative   • Not on file     Social Determinants of Health     Financial Resource Strain:    • Difficulty of Paying Living Expenses:    Food Insecurity:    • Worried About Running Out of Food in the Last Year:    • Ran Out of Food in the Last Year:    Transportation Needs:    • Lack of Transportation (Medical):    • Lack of Transportation (Non-Medical):    Physical Activity:    • Days of Exercise per Week:    • Minutes of Exercise per Session:    Stress:    • Feeling of Stress :    Social Connections:    • Frequency of Communication with Friends and Family:    • Frequency of Social Gatherings with Friends and Family:    • Attends Mormonism Services:    • Active Member of Clubs or Organizations:    • Attends Club or Organization Meetings:    • Marital Status:    Intimate Partner Violence:    • Fear of Current or Ex-Partner:    • Emotionally Abused:    • Physically Abused:    • Sexually Abused:         Family History:   Family History   Problem Relation Age of Onset   • Breast Cancer Mother 52   • Cancer Maternal Grandmother         Ovarian CA    • Heart Disease Maternal Grandfather    • Thyroid Sister         Hyperthyroid    • Stroke Neg Hx          PHYSICAL EXAM:   Vital signs: Virtual Visit  GENERAL: Well-developed, well-nourished  in no apparent distress.   EYE: No ocular and eyelid asymmetry, Anicteric sclerae,  PERRL, No exophthalmos or lidlag  HENT: Hearing grossly intact, Normocephalic, atraumatic. Pink, moist mucous membranes, No exudate  NECK: Supple. Trachea midline.   CARDIOVASCULAR: Regular rate and rhythm. No murmurs, rubs, or gallops.   LUNGS: Clear to auscultation bilaterally   ABDOMEN: Soft, nontender with positive bowel sounds.   EXTREMITIES: No clubbing, cyanosis, or edema.   NEUROLOGICAL: Cranial nerves II-XII are grossly intact   Symmetric reflexes at the patella no proximal muscle weakness, No visible tremor with both outstretched hands  LYMPH: No cervical,  supraclavicular,  adenopathy seen.   SKIN: No rashes, lesions. Turgor is normal.    ASSESSMENT/PLAN:   1. Thyroid function test abnormal  Unstable.  Stop Emergen C 1 week prior to lab draw.   Repeat labs:   THS  FT4  TPO  TRAbs    2. PCOS (polycystic ovarian syndrome)  Unstable  Continue treatment with OB/GYN. Considering MARGUERITE if spironolactone alone doesn't decrease symptoms.   Continue Spironolactone 25mg QD.     3. Elevated testosterone level in female  Unstable  As per plan #2.     Repeat labs 1 week prior to appointment.   Next appointment in 3 months.     Thank you kindly for allowing me to participate in the thyroid care plan for this patient.    Sonya Hardin  02/23/21    CC:   Dr. Roseann Bradford

## 2021-02-26 ASSESSMENT — PATIENT HEALTH QUESTIONNAIRE - PHQ9: CLINICAL INTERPRETATION OF PHQ2 SCORE: 0

## 2021-04-02 ENCOUNTER — TELEPHONE (OUTPATIENT)
Dept: ENDOCRINOLOGY | Facility: MEDICAL CENTER | Age: 26
End: 2021-04-02

## 2021-04-02 NOTE — TELEPHONE ENCOUNTER
VOICEMAIL  1. Caller Name: Fozia Nichole                        Call Back Number: [phone]      2. Message: Patient called and left a voicemail on 03/02/21. She needed to r/s her appointment for 05/17/21    3. Patient approves office to leave a detailed voicemail/[employer] message: yes    I called patient but there was no answer. I left a voicemail letting her know we received our message. I asked patient to please call us back and provided our phone number. I also told patient to please send us a [employer] msg with days and times that will work for her.

## 2021-04-13 ENCOUNTER — OFFICE VISIT (OUTPATIENT)
Dept: MEDICAL GROUP | Facility: MEDICAL CENTER | Age: 26
End: 2021-04-13
Payer: COMMERCIAL

## 2021-04-13 VITALS
SYSTOLIC BLOOD PRESSURE: 112 MMHG | TEMPERATURE: 97.2 F | BODY MASS INDEX: 25.01 KG/M2 | HEART RATE: 63 BPM | DIASTOLIC BLOOD PRESSURE: 80 MMHG | HEIGHT: 68 IN | WEIGHT: 165 LBS | OXYGEN SATURATION: 98 %

## 2021-04-13 DIAGNOSIS — Z00.00 HEALTHCARE MAINTENANCE: ICD-10-CM

## 2021-04-13 DIAGNOSIS — N91.2 AMENORRHEA: ICD-10-CM

## 2021-04-13 DIAGNOSIS — E05.90 HYPERTHYROIDISM: ICD-10-CM

## 2021-04-13 DIAGNOSIS — R79.89 ELEVATED TESTOSTERONE LEVEL: ICD-10-CM

## 2021-04-13 DIAGNOSIS — J45.20 MILD INTERMITTENT ASTHMA, UNSPECIFIED WHETHER COMPLICATED: ICD-10-CM

## 2021-04-13 DIAGNOSIS — R79.89 HIGH SERUM TESTOSTERONE: ICD-10-CM

## 2021-04-13 PROBLEM — L70.0 ACNE VULGARIS: Status: RESOLVED | Noted: 2019-01-22 | Resolved: 2021-04-13

## 2021-04-13 PROCEDURE — 99214 OFFICE O/P EST MOD 30 MIN: CPT | Performed by: PHYSICIAN ASSISTANT

## 2021-04-13 RX ORDER — ALBUTEROL SULFATE 90 UG/1
AEROSOL, METERED RESPIRATORY (INHALATION)
Qty: 1 EACH | Refills: 3 | Status: SHIPPED | OUTPATIENT
Start: 2021-04-13 | End: 2021-08-12

## 2021-04-13 NOTE — ASSESSMENT & PLAN NOTE
Patient states she was told she has PCOS and put on spironolactone 25 mg by gynecology.  She did have transvaginal pelvic ultrasound done.  States although report stated it was normal that gynecologist reviewed actual imaging and thought she did have multiple small cysts indicative of PCOS and that is why she is on spironolactone.  She has not noticed any change in menstrual cycle though and has not had a period

## 2021-04-13 NOTE — ASSESSMENT & PLAN NOTE
Chronic: Stable takes Ventolin inhaler as needed.  Worse with seasonal changes.  Also takes Zyrtec with good relief

## 2021-04-13 NOTE — PROGRESS NOTES
Subjective:   Fozia Nichole is a 25 y.o. female here today for establishing care    Amenorrhea  This is a chronic problem: Patient has irregular periods for two years. Has not had an acutal period for two years. Two episodes of bleeding in the last two years.  Not currently on any birth control. Was on OCPs previously.  Patient has not had sexual activity with a male in 6 years.  Is currently sexually active with female partners only.  Has been following with gynecology.  Last seen in February 2021.  Had T veg pelvic ultrasound done in October 2020.  Report was read as normal but she states gynecology mentioned that it showed small cysts.  She was put on spironolactone for this condition but has not noted any recurrence of menstrual cycle      Mild intermittent asthma  Chronic: Stable takes Ventolin inhaler as needed.  Worse with seasonal changes.  Also takes Zyrtec with good relief    Elevated testosterone level  This is a new problem patient states she was told she has PCOS and put on spironolactone 25 mg by gynecology.  She did have transvaginal pelvic ultrasound done.  States although report stated it was normal that gynecologist reviewed actual imaging and thought she did have multiple small cysts indicative of PCOS and that is why she is on spironolactone.  She has not noticed any change in menstrual cycle though and has not had a period         Current medicines (including changes today)  Current Outpatient Medications   Medication Sig Dispense Refill   • albuterol 108 (90 Base) MCG/ACT Aero Soln inhalation aerosol Take 1 to 2 puffs every 4-6 hours as needed for shortness of breath or wheezing 1 Each 3   • Multiple Vitamin (MULTIVITAMIN ADULT PO) Take  by mouth. Taking Emergen-C     • spironolactone (ALDACTONE) 25 MG Tab Take 1 Tab by mouth every day. 30 Tab 3     No current facility-administered medications for this visit.     She  has a past medical history of Migraine headache, Mild intermittent  "asthma (4/13/2021), and Ulcer. She also has no past medical history of Cancer (HCC), Diabetes (HCC), Hyperlipidemia, or Hypertension.  Patient has no known allergies.     Social History and Family History were reviewed and updated.    ROS   No headaches, chest pain, no shortness of breath, abdominal pain, nausea, or vomiting.  All other systems were reviewed and are negative or noted as positive in the HPI.       Objective:     /80 (BP Location: Right arm, Patient Position: Sitting)   Pulse 63   Temp 36.2 °C (97.2 °F) (Temporal)   Ht 1.727 m (5' 8\")   Wt 74.8 kg (165 lb)   SpO2 98%  Body mass index is 25.09 kg/m².     Physical Exam:  Constitutional: ANO x3, no acute distress, well-nourished, well-hydrated   Skin: Warm, dry, good turgor, no rashes in visible areas.  HEENT: Is normocephalic atraumatic, good PERRLA, extraocular movements intact, TMs and oropharynx are clear with good dentition   Neck: Soft and supple, trachea midline, no masses.  Thyroid felt full bilaterally no discrete masses palpable nor cervical lymphadenopathy noted  Cardiovascular: Regular rate and rhythm.  Normal S1 and 2, no murmurs, rubs or gallops.  No edema noted  Lungs: Clear to auscultation bilaterally.  No wheezes, rales or rhonchi.  Good inspiratory and expiratory breath sounds  Abdomen: Soft, non-tender, no masses.  No hepatosplenomegaly.  Negative Arzate's  Psych: Alert and oriented x3, normal affect and mood.  Neuro: Cranial nerves II through XII were assessed and intact.  Normal gait, normal cerebellar function noted  Musculoskeletal: Full range of motion, good strength against resistance    Clinical Course/Lab Analysis:    All labs and imaging for the last year were reviewed with patient at length    Assessment and Plan:   The following treatment plan was discussed.  Signs and symptoms for which to return were discussed with patient at length.  Patient verbalized understanding.    1. Amenorrhea  -When reviewing labs it is " apparent that patient is hyperthyroid.  She has been following with endocrinology.  Continue follow-up.  Thyroid antibodies and TPO have been ordered.  She has TSH and free T4 be repeated as well. thyroid gland felt full on exam.  Will order thyroid ultrasound  Did offer OCPs as option or medroxyprogesterone to have breakthrough bleed.  Patient defers at this time    2. Mild intermittent asthma, unspecified whether complicated  - albuterol 108 (90 Base) MCG/ACT Aero Soln inhalation aerosol; Take 1 to 2 puffs every 4-6 hours as needed for shortness of breath or wheezing  Dispense: 1 Each; Refill: 3    3. Hyperthyroidism  -Patient's lab values consistent with hyperthyroidism in November 2020.  Repeat labs were requested in February 2021.  Did discuss with patient that she needs to get these done  To discuss avoiding gluten, processed foods and dairy at this time to help gut microbiota    4. Healthcare maintenance  - HEMOGLOBIN A1C; Future  - Comp Metabolic Panel; Future  - LIPID PANEL  - CBC WITH DIFFERENTIAL; Future  We will check lab values to see if elevated glucose and possibly insulin resistance playing a role in patient's symptoms.  If so Metformin would be option    5. High serum testosterone  - Testosterone, Free & Total, Adult Male (w/SHBG); Future  Pelvic ultrasound was unremarkable to rule out ovarian masses.    6. Thyroid Fullness  -Ordering thyroid US to investigate further       Followup: 8 weeks    Please note that this dictation was created using voice recognition software. I have made every reasonable attempt to correct obvious errors, but I expect that there are errors of grammar and possibly content that I did not discover before finalizing the note.

## 2021-04-13 NOTE — ASSESSMENT & PLAN NOTE
Patient has irregular periods for two years. Has not had an acutal period for two years. Two episodes of bleeding in the last two years.  Not currently on any birth control. Was on OCPs previously.    Sexually active with female partners only for six years. Saw Gynecology in Feb in 2021. Started on Spironolactone daily in October, 2020 but no period

## 2021-06-15 ENCOUNTER — APPOINTMENT (OUTPATIENT)
Dept: MEDICAL GROUP | Facility: MEDICAL CENTER | Age: 26
End: 2021-06-15
Payer: COMMERCIAL

## 2021-08-09 DIAGNOSIS — J45.20 MILD INTERMITTENT ASTHMA, UNSPECIFIED WHETHER COMPLICATED: ICD-10-CM

## 2021-08-12 RX ORDER — ALBUTEROL SULFATE 90 UG/1
AEROSOL, METERED RESPIRATORY (INHALATION)
Qty: 8.5 G | Refills: 1 | Status: SHIPPED | OUTPATIENT
Start: 2021-08-12 | End: 2022-04-21

## 2021-08-21 ENCOUNTER — OFFICE VISIT (OUTPATIENT)
Dept: URGENT CARE | Facility: PHYSICIAN GROUP | Age: 26
End: 2021-08-21
Payer: COMMERCIAL

## 2021-08-21 ENCOUNTER — HOSPITAL ENCOUNTER (OUTPATIENT)
Facility: MEDICAL CENTER | Age: 26
End: 2021-08-21
Attending: NURSE PRACTITIONER
Payer: COMMERCIAL

## 2021-08-21 VITALS
TEMPERATURE: 98 F | OXYGEN SATURATION: 96 % | BODY MASS INDEX: 25.01 KG/M2 | RESPIRATION RATE: 18 BRPM | HEIGHT: 68 IN | SYSTOLIC BLOOD PRESSURE: 122 MMHG | DIASTOLIC BLOOD PRESSURE: 78 MMHG | HEART RATE: 72 BPM | WEIGHT: 165 LBS

## 2021-08-21 DIAGNOSIS — Z11.3 SCREEN FOR STD (SEXUALLY TRANSMITTED DISEASE): ICD-10-CM

## 2021-08-21 DIAGNOSIS — Z86.19 HISTORY OF HERPES GENITALIS: ICD-10-CM

## 2021-08-21 PROCEDURE — 87480 CANDIDA DNA DIR PROBE: CPT

## 2021-08-21 PROCEDURE — 87491 CHLMYD TRACH DNA AMP PROBE: CPT

## 2021-08-21 PROCEDURE — 87591 N.GONORRHOEAE DNA AMP PROB: CPT

## 2021-08-21 PROCEDURE — 87510 GARDNER VAG DNA DIR PROBE: CPT

## 2021-08-21 PROCEDURE — 87660 TRICHOMONAS VAGIN DIR PROBE: CPT

## 2021-08-21 PROCEDURE — 99214 OFFICE O/P EST MOD 30 MIN: CPT | Performed by: NURSE PRACTITIONER

## 2021-08-21 RX ORDER — VALACYCLOVIR HYDROCHLORIDE 1 G/1
1000 TABLET, FILM COATED ORAL 2 TIMES DAILY
Qty: 20 TABLET | Refills: 0 | Status: SHIPPED | OUTPATIENT
Start: 2021-08-21 | End: 2021-08-31

## 2021-08-21 ASSESSMENT — ENCOUNTER SYMPTOMS
CONSTITUTIONAL NEGATIVE: 1
CHILLS: 0
FEVER: 0
ABDOMINAL PAIN: 0

## 2021-08-21 ASSESSMENT — VISUAL ACUITY: OU: 1

## 2021-08-21 NOTE — PATIENT INSTRUCTIONS
Sexually Transmitted Disease  A sexually transmitted disease (STD) is a disease or infection that may be passed (transmitted) from person to person, usually during sexual activity. This may happen by way of saliva, semen, blood, vaginal mucus, or urine. Common STDs include:  · Gonorrhea.  · Chlamydia.  · Syphilis.  · HIV and AIDS.  · Genital herpes.  · Hepatitis B and C.  · Trichomonas.  · Human papillomavirus (HPV).  · Pubic lice.  · Scabies.  · Mites.  · Bacterial vaginosis.  What are the causes?  An STD may be caused by bacteria, a virus, or parasites. STDs are often transmitted during sexual activity if one person is infected. However, they may also be transmitted through nonsexual means. STDs may be transmitted after:  · Sexual intercourse with an infected person.  · Sharing sex toys with an infected person.  · Sharing needles with an infected person or using unclean piercing or tattoo needles.  · Having intimate contact with the genitals, mouth, or rectal areas of an infected person.  · Exposure to infected fluids during birth.  What are the signs or symptoms?  Different STDs have different symptoms. Some people may not have any symptoms. If symptoms are present, they may include:  · Painful or bloody urination.  · Pain in the pelvis, abdomen, vagina, anus, throat, or eyes.  · A skin rash, itching, or irritation.  · Growths, ulcerations, blisters, or sores in the genital and anal areas.  · Abnormal vaginal discharge with or without bad odor.  · Penile discharge in men.  · Fever.  · Pain or bleeding during sexual intercourse.  · Swollen glands in the groin area.  · Yellow skin and eyes (jaundice). This is seen with hepatitis.  · Swollen testicles.  · Infertility.  · Sores and blisters in the mouth.  How is this diagnosed?  To make a diagnosis, your health care provider may:  · Take a medical history.  · Perform a physical exam.  · Take a sample of any discharge to examine.  · Swab the throat, cervix, opening to  the penis, rectum, or vagina for testing.  · Test a sample of your first morning urine.  · Perform blood tests.  · Perform a Pap test, if this applies.  · Perform a colposcopy.  · Perform a laparoscopy.  How is this treated?  Treatment depends on the STD. Some STDs may be treated but not cured.  · Chlamydia, gonorrhea, trichomonas, and syphilis can be cured with antibiotic medicine.  · Genital herpes, hepatitis, and HIV can be treated, but not cured, with prescribed medicines. The medicines lessen symptoms.  · Genital warts from HPV can be treated with medicine or by freezing, burning (electrocautery), or surgery. Warts may come back.  · HPV cannot be cured with medicine or surgery. However, abnormal areas may be removed from the cervix, vagina, or vulva.  · If your diagnosis is confirmed, your recent sexual partners need treatment. This is true even if they are symptom-free or have a negative culture or evaluation. They should not have sex until their health care providers say it is okay.  · Your health care provider may test you for infection again 3 months after treatment.  How is this prevented?  Take these steps to reduce your risk of getting an STD:  · Use latex condoms, dental dams, and water-soluble lubricants during sexual activity. Do not use petroleum jelly or oils.  · Avoid having multiple sex partners.  · Do not have sex with someone who has other sex partners.  · Do not have sex with anyone you do not know or who is at high risk for an STD.  · Avoid risky sex practices that can break your skin.  · Do not have sex if you have open sores on your mouth or skin.  · Avoid drinking too much alcohol or taking illegal drugs. Alcohol and drugs can affect your judgment and put you in a vulnerable position.  · Avoid engaging in oral and anal sex acts.  · Get vaccinated for HPV and hepatitis. If you have not received these vaccines in the past, talk to your health care provider about whether one or both might be  right for you.  · If you are at risk of being infected with HIV, it is recommended that you take a prescription medicine daily to prevent HIV infection. This is called pre-exposure prophylaxis (PrEP). You are considered at risk if:  ¨ You are a man who has sex with other men (MSM).  ¨ You are a heterosexual man or woman and are sexually active with more than one partner.  ¨ You take drugs by injection.  ¨ You are sexually active with a partner who has HIV.  · Talk with your health care provider about whether you are at high risk of being infected with HIV. If you choose to begin PrEP, you should first be tested for HIV. You should then be tested every 3 months for as long as you are taking PrEP.  Contact a health care provider if:  · See your health care provider.  · Tell your sexual partner(s). They should be tested and treated for any STDs.  · Do not have sex until your health care provider says it is okay.  Get help right away if:  Contact your health care provider right away if:  · You have severe abdominal pain.  · You are a man and notice swelling or pain in your testicles.  · You are a woman and notice swelling or pain in your vagina.  This information is not intended to replace advice given to you by your health care provider. Make sure you discuss any questions you have with your health care provider.  Document Released: 03/09/2004 Document Revised: 07/07/2017 Document Reviewed: 07/08/2014  [employer] Patient Education © 2017 [employer]        Genital Herpes  Genital herpes is a common sexually transmitted infection (STI) that is caused by a virus. The virus spreads from person to person through sexual contact. Infection can cause itching, blisters, and sores around the genitals or rectum. Symptoms may last several days and then go away This is called an outbreak. However, the virus remains in your body, so you may have more outbreaks in the future. The time between outbreaks varies and can be  months or years.  Genital herpes affects men and women. It is particularly concerning for pregnant women because the virus can be passed to the baby during delivery and can cause serious problems. Genital herpes is also a concern for people who have a weak disease-fighting (immune) system.  What are the causes?  This condition is caused by the herpes simplex virus (HSV) type 1 or type 2. The virus may spread through:  · Sexual contact with an infected person, including vaginal, anal, and oral sex.  · Contact with fluid from a herpes sore.  · The skin. This means that you can get herpes from an infected partner even if he or she does not have a visible sore or does not know that he or she is infected.  What increases the risk?  You are more likely to develop this condition if:  · You have sex with many partners.  · You do not use latex condoms during sex.  What are the signs or symptoms?  Most people do not have symptoms (asymptomatic) or have mild symptoms that may be mistaken for other skin problems. Symptoms may include:  · Small red bumps near the genitals, rectum, or mouth. These bumps turn into blisters and then turn into sores.  · Flu-like symptoms, including:  ? Fever.  ? Body aches.  ? Swollen lymph nodes.  ? Headache.  · Painful urination.  · Pain and itching in the genital area or rectal area.  · Vaginal discharge.  · Tingling or shooting pain in the legs and buttocks.  Generally, symptoms are more severe and last longer during the first (primary) outbreak. Flu-like symptoms are also more common during the primary outbreak.  How is this diagnosed?  Genital herpes may be diagnosed based on:  · A physical exam.  · Your medical history.  · Blood tests.  · A test of a fluid sample (culture) from an open sore.  How is this treated?  There is no cure for this condition, but treatment with antiviral medicines that are taken by mouth (orally) can do the following:  · Speed up healing and relieve symptoms.  · Help  to reduce the spread of the virus to sexual partners.  · Limit the chance of future outbreaks, or make future outbreaks shorter.  · Lessen symptoms of future outbreaks.  Your health care provider may also recommend pain relief medicines, such as aspirin or ibuprofen.  Follow these instructions at home:  Sexual activity  · Do not have sexual contact during active outbreaks.  · Practice safe sex. Latex condoms and female condoms may help prevent the spread of the herpes virus.  General instructions  · Keep the affected areas dry and clean.  · Take over-the-counter and prescription medicines only as told by your health care provider.  · Avoid rubbing or touching blisters and sores. If you do touch blisters or sores:  ? Wash your hands thoroughly with soap and water.  ? Do not touch your eyes afterward.  · To help relieve pain or itching, you may take the following actions as directed by your health care provider:  ? Apply a cold, wet cloth (cold compress) to affected areas 4-6 times a day.  ? Apply a substance that protects your skin and reduces bleeding (astringent).  ? Apply a gel that helps relieve pain around sores (lidocaine gel).  ? Take a warm, shallow bath that cleans the genital area (sitz bath).  · Keep all follow-up visits as told by your health care provider. This is important.  How is this prevented?  · Use condoms. Although anyone can get genital herpes during sexual contact, even with the use of a condom, a condom can provide some protection.  · Avoid having multiple sexual partners.  · Talk with your sexual partner about any symptoms either of you may have. Also, talk with your partner about any history of STIs.  · Get tested for STIs before you have sex. Ask your partner to do the same.  · Do not have sexual contact if you have symptoms of genital herpes.  Contact a health care provider if:  · Your symptoms are not improving with medicine.  · Your symptoms return.  · You have new symptoms.  · You have  a fever.  · You have abdominal pain.  · You have redness, swelling, or pain in your eye.  · You notice new sores on other parts of your body.  · You are a woman and experience bleeding between menstrual periods.  · You have had herpes and you become pregnant or plan to become pregnant.  Summary  · Genital herpes is a common sexually transmitted infection (STI) that is caused by the herpes simplex virus (HSV) type 1 or type 2.  · These viruses are most often spread through sexual contact with an infected person.  · You are more likely to develop this condition if you have sex with many partners or you have unprotected sex.  · Most people do not have symptoms (asymptomatic) or have mild symptoms that may be mistaken for other skin problems. Symptoms occur as outbreaks that may happen months or years apart.  · There is no cure for this condition, but treatment with oral antiviral medicines can reduce symptoms, reduce the chance of spreading the virus to a partner, prevent future outbreaks, or shorten future outbreaks.  This information is not intended to replace advice given to you by your health care provider. Make sure you discuss any questions you have with your health care provider.  Document Released: 12/15/2001 Document Revised: 06/24/2019 Document Reviewed: 11/17/2017  [employer] Patient Education © 2020 [employer]

## 2021-08-21 NOTE — PROGRESS NOTES
Subjective:     Fozia Nichole is a 26 y.o. female who presents for Vaginal Itching (little discharge, x3 days, pt states she wants to get tested for herpes.  )       Other  This is a new problem. Pertinent negatives include no abdominal pain, chills, fever or rash.     Patient requesting STD screening.  Reports a vague discomfort in her private area.  Otherwise, denies other symptoms.  Denies a rash, fever, abdominal pain, chills, or urinary symptoms.  Reports a history of HSV 1 manifested as genital herpes in the past.  Denies blisters or lesions at this time.  Would like to be tested for STDs prior to resuming relationship.    Unique test result dated 9/23/2019 reviewed: Vaginal pathogen swab positive for BV.    Unique test result dated 9/23/2019 reviewed: Chlamydia/gonorrhea swab negative.    Patient was screened prior to rooming and denied COVID-19 diagnosis or contact with a person who has been diagnosed or is suspected to have COVID-19. During this visit, appropriate PPE was worn, hand hygiene was performed, and the patient and any visitors were masked.     PMH:  has a past medical history of Acne vulgaris (1/22/2019), Migraine headache, Migraine without aura (1/21/2014), Mild intermittent asthma (4/13/2021), and Ulcer. She also has no past medical history of Cancer ([facility]), Diabetes ([facility]), Hyperlipidemia, or Hypertension.    MEDS:   Current Outpatient Medications:   •  valacyclovir (VALTREX) 1 GM Tab, Take 1 Tablet by mouth 2 times a day for 10 days., Disp: 20 Tablet, Rfl: 0  •  albuterol 108 (90 Base) MCG/ACT Aero Soln inhalation aerosol, INHALE ONE TO TWO PUFFS BY MOUTH EVERY 4 TO 6 HOURS AS NEEDED FOR SHORTNESS OF BREATH, Disp: 8.5 g, Rfl: 1  •  Multiple Vitamin (MULTIVITAMIN ADULT PO), Take  by mouth. Taking Emergen-C, Disp: , Rfl:   •  spironolactone (ALDACTONE) 25 MG Tab, Take 1 Tab by mouth every day. (Patient not taking: Reported on 8/21/2021), Disp: 30 Tab, Rfl: 3    ALLERGIES: No Known  "Allergies    SURGHX:   Past Surgical History:   Procedure Laterality Date   • TONSILLECTOMY     • TYMPANOPLASTY       SOCHX:  reports that she has never smoked. She has never used smokeless tobacco. She reports current alcohol use. She reports current drug use. Drug: Marijuana.     FH: Reviewed with patient, not pertinent to this visit.    Review of Systems   Constitutional: Negative.  Negative for chills, fever and malaise/fatigue.   Gastrointestinal: Negative for abdominal pain.   Genitourinary: Negative.  Negative for dysuria, frequency and urgency.   Skin: Negative for rash.   All other systems reviewed and are negative.    Additional details per HPI.      Objective:     /78   Pulse 72   Temp 36.7 °C (98 °F) (Temporal)   Resp 18   Ht 1.727 m (5' 8\")   Wt 74.8 kg (165 lb)   SpO2 96%   BMI 25.09 kg/m²     Physical Exam  Vitals reviewed.   Constitutional:       General: She is not in acute distress.     Appearance: She is well-developed. She is not ill-appearing or toxic-appearing.   HENT:      Head: Normocephalic.      Right Ear: External ear normal.      Left Ear: External ear normal.   Eyes:      General: Vision grossly intact.      Extraocular Movements: Extraocular movements intact.      Conjunctiva/sclera: Conjunctivae normal.   Cardiovascular:      Rate and Rhythm: Normal rate.   Pulmonary:      Effort: Pulmonary effort is normal. No respiratory distress.   Musculoskeletal:         General: No deformity. Normal range of motion.      Cervical back: Normal range of motion.   Skin:     Coloration: Skin is not pale.   Neurological:      Mental Status: She is alert and oriented to person, place, and time.      Sensory: No sensory deficit.      Motor: No weakness.      Coordination: Coordination normal.   Psychiatric:         Behavior: Behavior normal. Behavior is cooperative.       Assessment/Plan:     1. Screen for STD (sexually transmitted disease)  - VAGINAL PATHOGENS DNA PANEL; Future  - " Chlamydia/GC PCR Urine Or Swab; Future    2. History of herpes genitalis  - valacyclovir (VALTREX) 1 GM Tab; Take 1 Tablet by mouth 2 times a day for 10 days.  Dispense: 20 Tablet; Refill: 0    Rx as above sent electronically. Patient would like to keep on hand for future outbreak.    Differential diagnosis, natural history, supportive care, over-the-counter symptom management per 's instructions, close monitoring, and indications for immediate follow-up discussed.     All questions answered. Patient agrees with the plan of care.    Discharge summary provided.    Billing note: moderate complexity, test results reviewed, tests ordered, Rx drug management; please refer to note.

## 2021-08-22 DIAGNOSIS — Z11.3 SCREEN FOR STD (SEXUALLY TRANSMITTED DISEASE): ICD-10-CM

## 2021-08-22 LAB
C TRACH DNA SPEC QL NAA+PROBE: NEGATIVE
CANDIDA DNA VAG QL PROBE+SIG AMP: NEGATIVE
G VAGINALIS DNA VAG QL PROBE+SIG AMP: NEGATIVE
N GONORRHOEA DNA SPEC QL NAA+PROBE: NEGATIVE
SPECIMEN SOURCE: NORMAL
T VAGINALIS DNA VAG QL PROBE+SIG AMP: NEGATIVE

## 2021-10-22 ENCOUNTER — OFFICE VISIT (OUTPATIENT)
Dept: URGENT CARE | Facility: PHYSICIAN GROUP | Age: 26
End: 2021-10-22
Payer: COMMERCIAL

## 2021-10-22 ENCOUNTER — HOSPITAL ENCOUNTER (OUTPATIENT)
Facility: MEDICAL CENTER | Age: 26
End: 2021-10-22
Attending: PHYSICIAN ASSISTANT
Payer: COMMERCIAL

## 2021-10-22 VITALS
HEART RATE: 66 BPM | BODY MASS INDEX: 25.46 KG/M2 | TEMPERATURE: 97.7 F | DIASTOLIC BLOOD PRESSURE: 80 MMHG | SYSTOLIC BLOOD PRESSURE: 120 MMHG | OXYGEN SATURATION: 98 % | RESPIRATION RATE: 18 BRPM | HEIGHT: 68 IN | WEIGHT: 168 LBS

## 2021-10-22 DIAGNOSIS — R30.0 DYSURIA: ICD-10-CM

## 2021-10-22 DIAGNOSIS — N89.8 VAGINAL IRRITATION: ICD-10-CM

## 2021-10-22 LAB
APPEARANCE UR: CLEAR
BILIRUB UR STRIP-MCNC: NEGATIVE MG/DL
COLOR UR AUTO: YELLOW
GLUCOSE UR STRIP.AUTO-MCNC: NEGATIVE MG/DL
INT CON NEG: NORMAL
INT CON POS: NORMAL
KETONES UR STRIP.AUTO-MCNC: NEGATIVE MG/DL
LEUKOCYTE ESTERASE UR QL STRIP.AUTO: NORMAL
NITRITE UR QL STRIP.AUTO: NEGATIVE
PH UR STRIP.AUTO: 7.5 [PH] (ref 5–8)
POC URINE PREGNANCY TEST: NEGATIVE
PROT UR QL STRIP: NEGATIVE MG/DL
RBC UR QL AUTO: NORMAL
SP GR UR STRIP.AUTO: 1.01
UROBILINOGEN UR STRIP-MCNC: 0.2 MG/DL

## 2021-10-22 PROCEDURE — 81025 URINE PREGNANCY TEST: CPT | Performed by: PHYSICIAN ASSISTANT

## 2021-10-22 PROCEDURE — 87660 TRICHOMONAS VAGIN DIR PROBE: CPT

## 2021-10-22 PROCEDURE — 99213 OFFICE O/P EST LOW 20 MIN: CPT | Performed by: PHYSICIAN ASSISTANT

## 2021-10-22 PROCEDURE — 87480 CANDIDA DNA DIR PROBE: CPT

## 2021-10-22 PROCEDURE — 81002 URINALYSIS NONAUTO W/O SCOPE: CPT | Performed by: PHYSICIAN ASSISTANT

## 2021-10-22 PROCEDURE — 87086 URINE CULTURE/COLONY COUNT: CPT

## 2021-10-22 PROCEDURE — 87510 GARDNER VAG DNA DIR PROBE: CPT

## 2021-10-22 RX ORDER — PHENAZOPYRIDINE HYDROCHLORIDE 100 MG/1
100 TABLET, FILM COATED ORAL 3 TIMES DAILY PRN
Qty: 6 TABLET | Refills: 0 | Status: SHIPPED
Start: 2021-10-22 | End: 2022-07-18

## 2021-10-22 RX ORDER — NITROFURANTOIN 25; 75 MG/1; MG/1
100 CAPSULE ORAL 2 TIMES DAILY
Qty: 10 CAPSULE | Refills: 0 | Status: SHIPPED | OUTPATIENT
Start: 2021-10-22 | End: 2021-10-27

## 2021-10-22 RX ORDER — NITROFURANTOIN 25; 75 MG/1; MG/1
100 CAPSULE ORAL 2 TIMES DAILY
COMMUNITY
End: 2022-07-18

## 2021-10-23 DIAGNOSIS — R30.0 DYSURIA: ICD-10-CM

## 2021-10-23 DIAGNOSIS — N89.8 VAGINAL IRRITATION: ICD-10-CM

## 2021-10-23 NOTE — PROGRESS NOTES
"Subjective:   Fozia Nichole is a 26 y.o. female who presents for Dysuria (pain, burning, frequency, urgency, x3 days. )      HPI  26 y.o. female presents to urgent care with new problem to provider of dysuria, hematuria, urgency, frequency onset about 3 days ago history of UTI, this feels similar.  She also reports mild vaginal irritation.  No discharge or bleeding.  Denies fever, vomiting, flank pain, or abdominal pain.   Denies other associated aggravating or alleviating factors.     Review of Systems   Constitutional: Negative for chills and fever.   Gastrointestinal: Negative for abdominal pain, nausea and vomiting.   Genitourinary: Positive for dysuria, frequency, hematuria and urgency. Negative for flank pain.       Patient Active Problem List   Diagnosis   • PTSD (post-traumatic stress disorder)   • PCOS (polycystic ovarian syndrome)   • Elevated testosterone level   • Amenorrhea   • Mild intermittent asthma     Past Surgical History:   Procedure Laterality Date   • TONSILLECTOMY     • TYMPANOPLASTY       Social History     Tobacco Use   • Smoking status: Never Smoker   • Smokeless tobacco: Never Used   Vaping Use   • Vaping Use: Never used   Substance Use Topics   • Alcohol use: Yes     Comment: socially    • Drug use: Yes     Types: Marijuana      Family History   Problem Relation Age of Onset   • Breast Cancer Mother 52   • Cancer Maternal Grandmother         Ovarian CA    • Heart Disease Maternal Grandfather    • Thyroid Sister         Hyperthyroid    • Stroke Neg Hx       (Allergies, Medications, & Tobacco/Substance Use were reconciled by the Medical Assistant and reviewed by myself. The family history is prepopulated)     Objective:     /80   Pulse 66   Temp 36.5 °C (97.7 °F) (Temporal)   Resp 18   Ht 1.727 m (5' 8\")   Wt 76.2 kg (168 lb)   SpO2 98%   BMI 25.54 kg/m²     Physical Exam  Vitals reviewed.   Constitutional:       General: She is not in acute distress.     Appearance: " Normal appearance. She is well-developed. She is not ill-appearing.   HENT:      Head: Normocephalic and atraumatic.   Eyes:      Conjunctiva/sclera: Conjunctivae normal.   Cardiovascular:      Rate and Rhythm: Normal rate and regular rhythm.      Heart sounds: Normal heart sounds.   Pulmonary:      Effort: Pulmonary effort is normal. No respiratory distress.      Breath sounds: Normal breath sounds.   Abdominal:      General: Bowel sounds are normal.      Palpations: Abdomen is soft.      Tenderness: There is no abdominal tenderness. There is no right CVA tenderness, left CVA tenderness, guarding or rebound.   Musculoskeletal:      Cervical back: Normal range of motion and neck supple.   Skin:     General: Skin is warm and dry.   Neurological:      General: No focal deficit present.      Mental Status: She is alert and oriented to person, place, and time.   Psychiatric:         Mood and Affect: Mood normal.         Behavior: Behavior normal.         Thought Content: Thought content normal.         Judgment: Judgment normal.         Assessment/Plan:     1. Dysuria  POCT Urinalysis    POCT Pregnancy    URINE CULTURE(NEW)    VAGINAL PATHOGENS DNA PANEL    phenazopyridine (PYRIDIUM) 100 MG Tab   2. Vaginal irritation  URINE CULTURE(NEW)    VAGINAL PATHOGENS DNA PANEL    nitrofurantoin (MACROBID) 100 MG Cap     Point-of-care urinalysis is positive for bacteria and blood.  I will follow-up pending urine culture and vaginal pathogen DNA results.  Recommend patient start taking Pyridium for symptoms of dysuria.  She should wait to start Macrobid until definitive treatment plan with culture results.    Differential diagnosis, natural history, supportive care, and indications for immediate follow-up discussed.    Advised the patient to follow-up with the primary care physician for recheck, reevaluation, and consideration of further management.  Patient verbalized understanding of treatment plan and has no further questions  regarding care.     I personally reviewed prior external notes and test results pertinent to today's visit.     Please note that this dictation was created using voice recognition software. I have made a reasonable attempt to correct obvious errors, but I expect that there are errors of grammar and possibly content that I did not discover before finalizing the note.    This note was electronically signed by Anum Wang PA-C

## 2021-10-24 ENCOUNTER — TELEPHONE (OUTPATIENT)
Dept: URGENT CARE | Facility: PHYSICIAN GROUP | Age: 26
End: 2021-10-24

## 2021-10-24 DIAGNOSIS — B96.89 BACTERIAL VAGINOSIS: ICD-10-CM

## 2021-10-24 DIAGNOSIS — N76.0 BACTERIAL VAGINOSIS: ICD-10-CM

## 2021-10-24 RX ORDER — METRONIDAZOLE 500 MG/1
500 TABLET ORAL 2 TIMES DAILY
Qty: 14 TABLET | Refills: 0 | Status: SHIPPED | OUTPATIENT
Start: 2021-10-24 | End: 2021-10-31

## 2021-10-24 ASSESSMENT — ENCOUNTER SYMPTOMS
FEVER: 0
VOMITING: 0
NAUSEA: 0
CHILLS: 0
ABDOMINAL PAIN: 0
FLANK PAIN: 0

## 2021-10-25 LAB
BACTERIA UR CULT: NORMAL
SIGNIFICANT IND 70042: NORMAL
SITE SITE: NORMAL
SOURCE SOURCE: NORMAL

## 2022-04-19 ENCOUNTER — OFFICE VISIT (OUTPATIENT)
Dept: MEDICAL GROUP | Facility: MEDICAL CENTER | Age: 27
End: 2022-04-19
Payer: COMMERCIAL

## 2022-04-19 VITALS
BODY MASS INDEX: 26.7 KG/M2 | SYSTOLIC BLOOD PRESSURE: 124 MMHG | TEMPERATURE: 97.3 F | WEIGHT: 176.2 LBS | OXYGEN SATURATION: 98 % | HEIGHT: 68 IN | DIASTOLIC BLOOD PRESSURE: 80 MMHG | HEART RATE: 88 BPM

## 2022-04-19 DIAGNOSIS — M62.830 SPASM OF LUMBAR PARASPINOUS MUSCLE: ICD-10-CM

## 2022-04-19 DIAGNOSIS — S76.311A STRAIN OF RIGHT HAMSTRING, INITIAL ENCOUNTER: ICD-10-CM

## 2022-04-19 DIAGNOSIS — J45.20 MILD INTERMITTENT ASTHMA, UNSPECIFIED WHETHER COMPLICATED: ICD-10-CM

## 2022-04-19 PROCEDURE — 99214 OFFICE O/P EST MOD 30 MIN: CPT | Performed by: PHYSICIAN ASSISTANT

## 2022-04-19 RX ORDER — METHYLPREDNISOLONE 4 MG/1
TABLET ORAL
Qty: 21 TABLET | Refills: 0 | Status: SHIPPED
Start: 2022-04-19 | End: 2022-05-24

## 2022-04-19 RX ORDER — CYCLOBENZAPRINE HCL 5 MG
5 TABLET ORAL 2 TIMES DAILY PRN
Qty: 20 TABLET | Refills: 0 | Status: SHIPPED
Start: 2022-04-19 | End: 2022-05-24

## 2022-04-20 NOTE — ASSESSMENT & PLAN NOTE
Patient reports a 1 year history of pain in the back of the right hamstring as well as right-sided low back and buttocks pain.  Reports that standing makes it worse and walking makes it better.  It is constant and dull.  Wearing high heels increases the pain severity so she states she never does this.  Ibuprofen helps the discomfort.  Denies any known fall, trauma or injury 1 year ago.  Does go to chiropractic care.  Denies numbness and tingling in her lower extremity.  Denies hip pain or knee pain denies swelling in the extremities

## 2022-04-20 NOTE — PROGRESS NOTES
Subjective:   Fozia Nichole is a 26 y.o. female here today for right leg pain    Strain of right hamstring  Patient reports a 1 year history of pain in the back of the right hamstring as well as right-sided low back and buttocks pain.  Reports that standing makes it worse and walking makes it better.  It is constant and dull.  Wearing high heels increases the pain severity so she states she never does this.  Ibuprofen helps the discomfort.  Denies any known fall, trauma or injury 1 year ago.  Does go to chiropractic care.  Denies numbness and tingling in her lower extremity.  Denies hip pain or knee pain denies swelling in the extremities         Current medicines (including changes today)  Current Outpatient Medications   Medication Sig Dispense Refill   • methylPREDNISolone (MEDROL DOSEPAK) 4 MG Tablet Therapy Pack As directed on the packaging label. 21 Tablet 0   • cyclobenzaprine (FLEXERIL) 5 mg tablet Take 1 Tablet by mouth 2 times a day as needed for Moderate Pain or Muscle Spasms. 20 Tablet 0   • nitrofurantoin (MACROBID) 100 MG Cap Take 100 mg by mouth 2 times a day.     • phenazopyridine (PYRIDIUM) 100 MG Tab Take 1 Tablet by mouth 3 times a day as needed. 6 Tablet 0   • albuterol 108 (90 Base) MCG/ACT Aero Soln inhalation aerosol INHALE ONE TO TWO PUFFS BY MOUTH EVERY 4 TO 6 HOURS AS NEEDED FOR SHORTNESS OF BREATH 8.5 g 1   • Multiple Vitamin (MULTIVITAMIN ADULT PO) Take  by mouth. Taking Emergen-C       No current facility-administered medications for this visit.     She  has a past medical history of Acne vulgaris (1/22/2019), Migraine headache, Migraine without aura (1/21/2014), Mild intermittent asthma (4/13/2021), and Ulcer.    She has no past medical history of Cancer (HCC), Diabetes (HCC), Hyperlipidemia, or Hypertension.  Patient has no known allergies.     Social History and Family History were reviewed and updated.    ROS   No headaches, chest pain, no shortness of breath, abdominal  "pain, nausea, or vomiting.  All other systems were reviewed and are negative or noted as positive in the HPI.       Objective:     /80 (BP Location: Right arm, Patient Position: Sitting)   Pulse 88   Temp 36.3 °C (97.3 °F) (Temporal)   Ht 1.727 m (5' 8\")   Wt 79.9 kg (176 lb 3.2 oz)   SpO2 98%  Body mass index is 26.79 kg/m².     Physical Exam:  Constitutional: ANO x3, no acute distress, well-nourished, well-hydrated   Skin: Warm, dry, good turgor, no rashes in visible areas.  HEENT: Is normocephalic atraumatic, good PERRLA, extraocular movements intact, TMs and oropharynx are clear with good dentition   Neck: Soft and supple, trachea midline, no masses.  No thyroid megaly or cervical lymphadenopathy noted  Cardiovascular: Regular rate and rhythm.  Normal S1 and 2, no murmurs, rubs or gallops.  No edema noted  Lungs: Clear to auscultation bilaterally.  No wheezes, rales or rhonchi.  Good inspiratory and expiratory breath sounds  Abdomen: Soft, non-tender, no masses.  No hepatosplenomegaly.  Negative Arzate's  Psych: Alert and oriented x3, normal affect and mood.  Neuro: Cranial nerves II through XII were assessed and intact.  Normal gait, normal cerebellar function noted  Musculoskeletal: Full range of motion, good strength against resistance.  Patient has pain in the right lower paralumbar region to palpation.  She also has pain in the groin to palpation.  She has no deficit noted with internal and external rotation of the hip.  Patient reports pain radiating from the buttocks straight down the hamstrings to the back of the knee but it is not occurring during examination.  Negative straight leg raise.  No pain with full flexion or extension.  No decrease with forward flexion with range of motion at the waist    Clinical Course/Lab Analysis:        Assessment and Plan:   The following treatment plan was discussed.  Signs and symptoms for which to return were discussed with patient at length.  Patient " verbalized understanding.    1. Strain of right hamstring, initial encounter  - methylPREDNISolone (MEDROL DOSEPAK) 4 MG Tablet Therapy Pack; As directed on the packaging label.  Dispense: 21 Tablet; Refill: 0  - cyclobenzaprine (FLEXERIL) 5 mg tablet; Take 1 Tablet by mouth 2 times a day as needed for Moderate Pain or Muscle Spasms.  Dispense: 20 Tablet; Refill: 0  - Referral to Sports Medicine    2. Spasm of lumbar paraspinous muscle  - methylPREDNISolone (MEDROL DOSEPAK) 4 MG Tablet Therapy Pack; As directed on the packaging label.  Dispense: 21 Tablet; Refill: 0  - cyclobenzaprine (FLEXERIL) 5 mg tablet; Take 1 Tablet by mouth 2 times a day as needed for Moderate Pain or Muscle Spasms.  Dispense: 20 Tablet; Refill: 0  - Referral to Sports Medicine      both Chronic and unstable.  Symptoms are worsening.  I Neelima send her to our sports medicine specialist.  She may need physical therapy will recommend anti-inflammatory and muscle relaxer to use sparingly.  Do recommend low weight bearing impact exercises such as swimming, walking or stationary bike.    Followup: 1 month or sooner as needed    Please note that this dictation was created using voice recognition software. I have made every reasonable attempt to correct obvious errors, but I expect that there are errors of grammar and possibly content that I did not discover before finalizing the note.

## 2022-04-21 RX ORDER — ALBUTEROL SULFATE 90 UG/1
AEROSOL, METERED RESPIRATORY (INHALATION)
Qty: 8.5 G | Refills: 1 | Status: SHIPPED
Start: 2022-04-21 | End: 2023-03-01

## 2022-05-05 ENCOUNTER — APPOINTMENT (OUTPATIENT)
Dept: RADIOLOGY | Facility: IMAGING CENTER | Age: 27
End: 2022-05-05
Attending: FAMILY MEDICINE
Payer: COMMERCIAL

## 2022-05-05 ENCOUNTER — OFFICE VISIT (OUTPATIENT)
Dept: SPORTS MEDICINE | Facility: CLINIC | Age: 27
End: 2022-05-05
Payer: COMMERCIAL

## 2022-05-05 VITALS
BODY MASS INDEX: 26.7 KG/M2 | RESPIRATION RATE: 18 BRPM | DIASTOLIC BLOOD PRESSURE: 78 MMHG | WEIGHT: 176.2 LBS | OXYGEN SATURATION: 99 % | TEMPERATURE: 98.3 F | HEIGHT: 68 IN | SYSTOLIC BLOOD PRESSURE: 122 MMHG | HEART RATE: 91 BPM

## 2022-05-05 DIAGNOSIS — M54.16 LUMBAR RADICULOPATHY, RIGHT: ICD-10-CM

## 2022-05-05 DIAGNOSIS — M41.87 DEXTROSCOLIOSIS OF LUMBOSACRAL SPINE: ICD-10-CM

## 2022-05-05 PROCEDURE — 99214 OFFICE O/P EST MOD 30 MIN: CPT | Performed by: FAMILY MEDICINE

## 2022-05-05 PROCEDURE — 72100 X-RAY EXAM L-S SPINE 2/3 VWS: CPT | Mod: TC | Performed by: FAMILY MEDICINE

## 2022-05-05 ASSESSMENT — ENCOUNTER SYMPTOMS
FEVER: 0
CHILLS: 0
DIZZINESS: 0
NAUSEA: 0
SHORTNESS OF BREATH: 0
VOMITING: 0

## 2022-05-05 NOTE — Clinical Note
Beto Randolph, Thank you for referring Fozia to our sports medicine clinic.  It appears she has some dextroscoliosis which is likely contributing to her lumbar spine pain. Provided her with some exercises and recommendations and plan on seeing her back in a month to see how things are coming along.  Hope you are well! L

## 2022-05-05 NOTE — PROGRESS NOTES
Chief Complaint   Patient presents with   • Back Pain     Referral from / Back pain    • Leg Pain     R leg pain        Subjective     Referred by Tomasa Farmer PA-C  for evaluation of RIGHT posterior thigh  1 year, insidious, worse over the past 2 months  Pulling/tightness sensation, throbbing/pulling  Pain is improved with walking  Worse with standing still, and sitting with her legs crossed, also worse with turning to the side while seated  No radiation  Occasional numbness in her toes on the RIGHT foot, along the region of the great toe and plantar aspect of the foot  Has FAILED chiropractic care and orthotics  She has noticed some weakness in the RIGHT leg  POSITIVE prior history of remote injury (patellar dislocation and meniscal tear when she was in eighth grade), she was managed nonoperatively, but she was casted in a long-leg cast for 3 months back then  Steroid pack, did not help, cyclobenzaprine also does not help  She does have lumbar spine pain, worse with sitting with poor posture    Desk job  Likes [employer], walks her dog daily    Review of Systems   Constitutional: Negative for chills and fever.   Respiratory: Negative for shortness of breath.    Cardiovascular: Negative for chest pain.   Gastrointestinal: Negative for nausea and vomiting.   Neurological: Negative for dizziness.     PMH:  has a past medical history of Acne vulgaris (1/22/2019), Migraine headache, Migraine without aura (1/21/2014), Mild intermittent asthma (4/13/2021), and Ulcer.    She has no past medical history of Cancer ([facility]), Diabetes ([facility]), Hyperlipidemia, or Hypertension.  MEDS:   Current Outpatient Medications:   •  albuterol 108 (90 Base) MCG/ACT Aero Soln inhalation aerosol, INHALE ONE TO TWO PUFFS BY MOUTH EVERY 4 TO 6 HOURS AS NEEDED FOR SHORTNESS OF BREATH, Disp: 8.5 g, Rfl: 1  •  methylPREDNISolone (MEDROL DOSEPAK) 4 MG Tablet Therapy Pack, As directed on the packaging label., Disp: 21 Tablet, Rfl: 0  •  cyclobenzaprine  Progress Notes by Cynthia Miles RN at 06/05/18 11:25 AM     Author:  Cynthia Miles RN Service:  (none) Author Type:  Registered Nurse-Ancillary     Filed:  06/05/18 11:28 AM Encounter Date:  6/5/2018 Status:  Signed     :  Cynthia Miles RN (Registered Nurse-Ancillary)            6/5/2018    Subjective:  Today's supervising provider is Dr. Perez  Rudy MENDOZA Garcia is a 70 year old male referred by[YR1.1T] Carlos Perez MD[YR1.2T]  for follow up  anticoagulation management and continued education for the indications listed below. Today’s reason for visit is:[YR1.1T] Standard follow-up interval[YR1.1M]; last visit with[YR1.1T] [YR1.1M]:[YR1.1T] 1/24/18[YR1.1M].   Previous INR was[YR1.1T] 3.0[YR1.1M] on[YR1.1T] 5/8/18[YR1.1M]. Dose[YR1.1T] maintained[YR1.1M] . Patient currently takes[YR1.1T] 2.5 mg Mon and 5 mg x 6 days[YR1.1M] of warfarin using[YR1.1T] 5mg[YR1.1M] tabs.  Anticoag Order Information 3/21/2018   ANTICOAG CLI -   [facility] REFERRAL INDICATION FOR ANTICOAGULATION VENOUS THROMBOEMBOLIC DISEASE   ACC REFERRAL DATE OF EVENT 9/7/11   [facility] REFERRAL INTENDED INR -   Comment -   ACC REFERRAL INTENDED DURATION TX LONG TERM-TO BE RENEWED ANNUALLY   Comment Per Dr. Perez.     Anticoagulation Episode Summary     Current INR goal 2.0-3.0        Today's Lab  Lab Results      Component  Value Date    INR 2.0 06/05/2018    INR 1.7 06/06/2017    INR 1.2 04/10/2017       Lab Results      Component  Value Date    WBPT 27.3 09/30/2014     Lab Results      Component  Value Date    WBINR 2.1 05/14/2015       Plan:  1.) Reinforcement of education was provided on the following topics at this visit:[YR1.1T]   Proper administration (day, time relationship to meals, missed doses, etc.)., INR testing (importance and reason for repeated testing)., Signs/symptoms of bleeding and reporting to physician., Dietary intake of vitamin K (dietary sources, other sources (i.e vitamins)., Drug interactions ( importance of  "(FLEXERIL) 5 mg tablet, Take 1 Tablet by mouth 2 times a day as needed for Moderate Pain or Muscle Spasms., Disp: 20 Tablet, Rfl: 0  •  nitrofurantoin (MACROBID) 100 MG Cap, Take 100 mg by mouth 2 times a day., Disp: , Rfl:   •  phenazopyridine (PYRIDIUM) 100 MG Tab, Take 1 Tablet by mouth 3 times a day as needed., Disp: 6 Tablet, Rfl: 0  •  Multiple Vitamin (MULTIVITAMIN ADULT PO), Take  by mouth. Taking Emergen-C, Disp: , Rfl:   ALLERGIES: No Known Allergies  SURGHX:   Past Surgical History:   Procedure Laterality Date   • TONSILLECTOMY     • TYMPANOPLASTY       SOCHX:  reports that she has never smoked. She has never used smokeless tobacco. She reports current alcohol use. She reports current drug use. Drug: Marijuana.  FH: Family history was reviewed, no pertinent findings to report    Objective   /78 (BP Location: Left arm, Patient Position: Sitting, BP Cuff Size: Adult)   Pulse 91   Temp 36.8 °C (98.3 °F) (Temporal)   Resp 18   Ht 1.727 m (5' 8\")   Wt 79.9 kg (176 lb 3.2 oz)   SpO2 99%   BMI 26.79 kg/m²     Lumbar spine exam:  No acute distress  Able to walk on heels and toes  Able to flex to 90° with MILD discomfort and radicular symptoms down the RIGHT leg to her RIGHT great toe  Extension and lateral rotation with MILD discomfort  Strength testing with hip flexion, knee flexion and extension, ankle dorsiflexion and plantarflexion, and EHL testing were 5 out of 5 bilaterally  Slump test was POSITIVE on the RIGHT compared to the left  Sensation was DECREASED along the RIGHT first and second toe webspace/L5 pattern  The legs were otherwise neurovascularly intact    HIP EXAM:  NORMAL gait    Right hip: Range of motion is intact  NEGATIVE pain with internal rotation  francis's test is NEGATIVE  Mild tenderness of the trochanteric bursa  POSITIVE tenderness of the gluteus medius  Abigail's test is NEGATIVE    Left hip: Range of motion is intact  NEGATIVE pain with internal rotation  francis's test is " notifiying [facility] of medication changes - additions and discontinuations)., Physical activity and the risk of falls. and Contacting the [facility] for Warfarin refills.[YR1.1M]  2. Rudy advised to[YR1.1T] maintain dose of 2.5 mg Mon and 5 mg x 6 days[YR1.1M] of Warfarin and to recheck INR in[YR1.1T] 4 weeks[YR1.1M] in[YR1.1T] [facility][YR1.1M].   Patient has[YR1.1T] 5[YR1.1M] mg tablets. Per patient, a warfarin refill[YR1.1T] is not[YR1.1M] needed.  Protocol was[YR1.1T] followed[YR1.1M].  3. Rudy instructed to[YR1.1T] maintain current vitamin K intake.[YR1.1M]    Rudy was provided with verbal and written instructions and educational materials.  Rudy verbalized understanding.     More than[YR1.1T] 15[YR1.1M] minutes was used in instructing and educating the patient on VKA therapy and management.       Electronically Signed by:    Cynthia Miles RN , 6/5/2018[YR1.1T]      Revision History        User Key Date/Time User Provider Type Action    > YR1.2 06/05/18 11:28 AM Cynthia Miles RN Registered Nurse-Ancillary Sign     YR1.1 06/05/18 11:25 AM Cynthia Miles RN Registered Nurse-Ancillary     M - Manual, T - Template             NEGATIVE  NO tenderness of the trochanteric bursa  NO tenderness of the gluteus medius  Abigail's test is NEGATIVE    1. Lumbar radiculopathy, right  DX-LUMBAR SPINE-2 OR 3 VIEWS   2. Dextroscoliosis of lumbosacral spine       1 year, insidious, worse over the past 2 months  Pulling/tightness sensation, throbbing/pulling    Provided with spine program, home program  Recommended trying [facility]    Offered formal physical therapy, but patient politely declined at this time because of her busy work schedule    Return in about 4 weeks (around 6/2/2022).   To see how she is doing with home exercise program and the [facility] speedy            5/5/2022 12:00 PM     HISTORY/REASON FOR EXAM:  Atraumatic Pain; 1 year worth of posterior leg pain with RIGHT lumbar radiculopathy in an L5-S1 pattern.        TECHNIQUE/ EXAM DESCRIPTION AND NUMBER OF VIEWS:  3 views of the lumbar spine.     COMPARISON: None.     FINDINGS:  Mild lumbar dextroscoliosis.  There is no acute fracture or dislocation.     Multilevel facet degeneration which appears most pronounced in the lower lumbar spine.  There is probably mild dorsal disc narrowing at L3-L4.     Bone mineralization is age-appropriate.        IMPRESSION:     1.  Multilevel facet degeneration and minor disc degeneration.  2.  Mild lumbar dextroscoliosis.             Exam Ended: 05/05/22 12:11 PM Last Resulted: 05/05/22 12:13 PM           Interpreted in the office today with the patient    Thank you Tomasa Farmer PA-C for allowing me to participate in caring for your patient.    Greater than 45 minutes was spent reviewing patient history, discussing current issue, physical examination, communicating with referring provider, reviewing results and documenting the visit.

## 2022-05-24 ENCOUNTER — OFFICE VISIT (OUTPATIENT)
Dept: MEDICAL GROUP | Facility: MEDICAL CENTER | Age: 27
End: 2022-05-24
Payer: COMMERCIAL

## 2022-05-24 VITALS
OXYGEN SATURATION: 100 % | HEART RATE: 60 BPM | TEMPERATURE: 98.1 F | DIASTOLIC BLOOD PRESSURE: 80 MMHG | HEIGHT: 68 IN | WEIGHT: 179 LBS | BODY MASS INDEX: 27.13 KG/M2 | SYSTOLIC BLOOD PRESSURE: 110 MMHG

## 2022-05-24 DIAGNOSIS — M54.16 LUMBAR RADICULOPATHY, RIGHT: ICD-10-CM

## 2022-05-24 DIAGNOSIS — M41.80 DEXTROSCOLIOSIS: ICD-10-CM

## 2022-05-24 DIAGNOSIS — M79.604 PAIN IN RIGHT LEG: ICD-10-CM

## 2022-05-24 PROCEDURE — 99214 OFFICE O/P EST MOD 30 MIN: CPT | Performed by: PHYSICIAN ASSISTANT

## 2022-05-24 RX ORDER — MELOXICAM 15 MG/1
15 TABLET ORAL DAILY
Qty: 30 TABLET | Refills: 1 | Status: SHIPPED
Start: 2022-05-24 | End: 2022-07-18

## 2022-05-24 ASSESSMENT — PATIENT HEALTH QUESTIONNAIRE - PHQ9: CLINICAL INTERPRETATION OF PHQ2 SCORE: 0

## 2022-05-24 NOTE — PROGRESS NOTES
"Subjective:     Chief Complaint   Patient presents with   • Follow-Up     Fozia Nichole is a 27 y.o. female here today to follow to Discuss:    Lumbar radiculopathy, right  Patient comes in for follow-up.  She DC sports medicine who ordered x-rays and showed dextroscoliosis as well as some degenerative changes in the low spine.  She was told to go to physical therapy and follow-up in 1 month.  She does not feel her symptoms have improved.  She did take the steroid pack and muscle relaxer that I prescribed and was still reports discomfort in the right buttocks that radiates to the hamstring region and goes behind the right knee.  Sometimes has some tingling sensation associated with it.  Denies weakness with gait.  Denies pain with rotation of the hip.       Current medicines (including changes today)  Current Outpatient Medications   Medication Sig Dispense Refill   • meloxicam (MOBIC) 15 MG tablet Take 1 Tablet by mouth every day. 30 Tablet 1   • albuterol 108 (90 Base) MCG/ACT Aero Soln inhalation aerosol INHALE ONE TO TWO PUFFS BY MOUTH EVERY 4 TO 6 HOURS AS NEEDED FOR SHORTNESS OF BREATH 8.5 g 1   • nitrofurantoin (MACROBID) 100 MG Cap Take 100 mg by mouth 2 times a day.     • phenazopyridine (PYRIDIUM) 100 MG Tab Take 1 Tablet by mouth 3 times a day as needed. 6 Tablet 0   • Multiple Vitamin (MULTIVITAMIN ADULT PO) Take  by mouth. Taking Emergen-C       No current facility-administered medications for this visit.     She  has a past medical history of Acne vulgaris (1/22/2019), Migraine headache, Migraine without aura (1/21/2014), Mild intermittent asthma (4/13/2021), and Ulcer.    She has no past medical history of Cancer (HCC), Diabetes (HCC), Hyperlipidemia, or Hypertension.    ROS  As per listed in the HPI, otherwise negative     Objective:     /80 (BP Location: Right arm, Patient Position: Sitting, BP Cuff Size: Adult)   Pulse 60   Temp 36.7 °C (98.1 °F) (Temporal)   Ht 1.727 m (5' 8\")   " Wt 81.2 kg (179 lb)   SpO2 100%  Body mass index is 27.22 kg/m².     Physical Exam:  General: Patient appears well-nourished, well-hydrated, nontoxic  HEENT, normocephalic atraumatic, PERRLA, extraocular movements intact, nares are patent and clear  Neck: No visible masses, thyromegaly or abnormalities noted  Cardiovascular.  Sitting comfortably without visible signs of edema  Lungs: No cyanosis noted, nondyspneic  Skin: Well perfused without evidence of rash or lesions  Neurological: Cranial nerves II through XII intact, normal gait  Musculoskeletal: Normal range of motion, normal strength and no deficit noted.  No pain to palpation of the cervical, thoracic or lumbar spinous processes.  She has no pain to palpation in the para spinous lumbar region.  She has mild decreased range of motion with forward flexion.  There is no deficit noted in strength or range of motion with her lower extremities it is 5 out of 5.  No problem with external or internal rotation of the hip      Assessment and Plan:   The following treatment plan was discussed and signs and symptoms for which to return were discussed with patient.  Patient verbalized understanding.    1. Dextroscoliosis  New and unstable  - MR-LUMBAR SPINE-W/O; Future  - meloxicam (MOBIC) 15 MG tablet; Take 1 Tablet by mouth every day.  Dispense: 30 Tablet; Refill: 1    2. Lumbar radiculopathy, right  New and unstable  - MR-LUMBAR SPINE-W/O; Future  - meloxicam (MOBIC) 15 MG tablet; Take 1 Tablet by mouth every day.  Dispense: 30 Tablet; Refill: 1    3. Pain in right leg  New and unstable  - CBC WITH DIFFERENTIAL; Future  - Sed Rate; Future     Patient saw sports medicine and will be following up with them.  It was thought that she had lumbar radiculopathy.  I will put in an MRI for the lumbar region at this time for follow-up.  Do still recommend physical therapy.  We will DC muscle relaxer and Medrol Dosepak and try meloxicam and told to follow-up in a  month    Followup: Return in about 4 weeks (around 6/21/2022).         Please note that this dictation was created using voice recognition software. I have made every reasonable attempt to correct obvious errors, but I expect that there are errors of grammar and possibly content that I did not discover before finalizing the note.

## 2022-05-24 NOTE — ASSESSMENT & PLAN NOTE
Patient comes in for follow-up.  She DC sports medicine who ordered x-rays and showed dextroscoliosis as well as some degenerative changes in the low spine.  She was told to go to physical therapy and follow-up in 1 month.  She does not feel her symptoms have improved.  She did take the steroid pack and muscle relaxer that I prescribed and was still reports discomfort in the right buttocks that radiates to the hamstring region and goes behind the right knee.  Sometimes has some tingling sensation associated with it.  Denies weakness with gait.  Denies pain with rotation of the hip.

## 2022-06-02 ENCOUNTER — OFFICE VISIT (OUTPATIENT)
Dept: SPORTS MEDICINE | Facility: CLINIC | Age: 27
End: 2022-06-02
Payer: COMMERCIAL

## 2022-06-02 VITALS
HEART RATE: 82 BPM | SYSTOLIC BLOOD PRESSURE: 122 MMHG | RESPIRATION RATE: 16 BRPM | HEIGHT: 68 IN | OXYGEN SATURATION: 97 % | BODY MASS INDEX: 27.13 KG/M2 | WEIGHT: 179 LBS | DIASTOLIC BLOOD PRESSURE: 78 MMHG | TEMPERATURE: 98.3 F

## 2022-06-02 DIAGNOSIS — M41.87 DEXTROSCOLIOSIS OF LUMBOSACRAL SPINE: ICD-10-CM

## 2022-06-02 DIAGNOSIS — M54.16 LUMBAR RADICULOPATHY, RIGHT: ICD-10-CM

## 2022-06-02 PROCEDURE — 99213 OFFICE O/P EST LOW 20 MIN: CPT | Performed by: FAMILY MEDICINE

## 2022-06-02 ASSESSMENT — ENCOUNTER SYMPTOMS
DIZZINESS: 0
CHILLS: 0
FEVER: 0
VOMITING: 0
NAUSEA: 0
SHORTNESS OF BREATH: 0

## 2022-06-02 NOTE — PROGRESS NOTES
Chief Complaint   Patient presents with   • Back Pain     F/V Back pain        Subjective     Referred by Tomasa Farmer PA-C  for evaluation of RIGHT posterior thigh  1 year, insidious, worse over the past 3 months  Pulling/tightness sensation, throbbing/pulling  Pain is improved with walking  Worse with standing still, and sitting with her legs crossed, also worse with turning to the side while seated  No radiation  Occasional numbness in her toes on the RIGHT foot, along the region of the great toe and plantar aspect of the foot  Has FAILED chiropractic care and orthotics  She has noticed continued weakness in the RIGHT leg  POSITIVE prior history of remote injury (patellar dislocation and meniscal tear when she was in eighth grade), she was managed nonoperatively, but she was casted in a long-leg cast for 3 months back then  FAILED Steroid pack and cyclobenzaprine  She does have lumbar spine pain, worse with sitting with poor posture    Desk job  Likes [employer], walks her dog daily    Review of Systems   Constitutional: Negative for chills and fever.   Respiratory: Negative for shortness of breath.    Cardiovascular: Negative for chest pain.   Gastrointestinal: Negative for nausea and vomiting.   Neurological: Negative for dizziness.     PMH:  has a past medical history of Acne vulgaris (1/22/2019), Migraine headache, Migraine without aura (1/21/2014), Mild intermittent asthma (4/13/2021), and Ulcer.    She has no past medical history of Cancer ([facility]), Diabetes ([facility]), Hyperlipidemia, or Hypertension.  MEDS:   Current Outpatient Medications:   •  meloxicam (MOBIC) 15 MG tablet, Take 1 Tablet by mouth every day., Disp: 30 Tablet, Rfl: 1  •  albuterol 108 (90 Base) MCG/ACT Aero Soln inhalation aerosol, INHALE ONE TO TWO PUFFS BY MOUTH EVERY 4 TO 6 HOURS AS NEEDED FOR SHORTNESS OF BREATH, Disp: 8.5 g, Rfl: 1  •  nitrofurantoin (MACROBID) 100 MG Cap, Take 100 mg by mouth 2 times a day., Disp: , Rfl:   •  phenazopyridine  "(PYRIDIUM) 100 MG Tab, Take 1 Tablet by mouth 3 times a day as needed., Disp: 6 Tablet, Rfl: 0  •  Multiple Vitamin (MULTIVITAMIN ADULT PO), Take  by mouth. Taking Emergen-C, Disp: , Rfl:   ALLERGIES: No Known Allergies  SURGHX:   Past Surgical History:   Procedure Laterality Date   • TONSILLECTOMY     • TYMPANOPLASTY       SOCHX:  reports that she has never smoked. She has never used smokeless tobacco. She reports current alcohol use. She reports current drug use. Drug: Marijuana.  FH: Family history was reviewed, no pertinent findings to report    Objective   /78 (BP Location: Left arm, Patient Position: Sitting, BP Cuff Size: Adult)   Pulse 82   Temp 36.8 °C (98.3 °F) (Temporal)   Resp 16   Ht 1.727 m (5' 8\")   Wt 81.2 kg (179 lb)   SpO2 97%   BMI 27.22 kg/m²     Lumbar spine exam:  No acute distress  Able to walk on heels and toes  Able to flex to 90° with MILD discomfort and radicular symptoms down the RIGHT leg to her RIGHT great toe  Extension and lateral rotation with MILD discomfort  Strength testing with hip flexion, knee flexion and extension, ankle dorsiflexion and plantarflexion,   EHL testing was 4 out of 5 on the RIGHT and 5/5 on the LEFT which is a NEW finding which is a WORSENING finding compared to prior exam  Slump test was POSITIVE on the RIGHT compared to the left  Sensation was DECREASED along the RIGHT first and second toe webspace/L5 pattern  The legs were otherwise neurovascularly intact    HIP EXAM:  NORMAL gait    Right hip: Range of motion is intact  NEGATIVE pain with internal rotation  francis's test is NEGATIVE  Mild tenderness of the trochanteric bursa  POSITIVE tenderness of the gluteus medius  Abigail's test is NEGATIVE      1. Lumbar radiculopathy, right     2. Dextroscoliosis of lumbosacral spine        1 year, insidious, worse over the past 3 months  Pulling/tightness sensation, throbbing/pulling    Spine exercise program is actually making her symptoms WORSE  She now has " WORSE exam with weakness on EHL testing of the RIGHT compared to the left    PCP has appropriately ordered an MRI of the lumbar spine  Referral placed for physiatry consideration of TRINH pending MRI results, particularly if there is a noted disc herniation at the RIGHT L5-S1 nerve root    If she gets in to physiatry she can review MRI results with them, or she is more than welcome to return here if she would like to review the images and discuss findings in detail (whichever is more convenient for her)              5/5/2022 12:00 PM     HISTORY/REASON FOR EXAM:  Atraumatic Pain; 1 year worth of posterior leg pain with RIGHT lumbar radiculopathy in an L5-S1 pattern.        TECHNIQUE/ EXAM DESCRIPTION AND NUMBER OF VIEWS:  3 views of the lumbar spine.     COMPARISON: None.     FINDINGS:  Mild lumbar dextroscoliosis.  There is no acute fracture or dislocation.     Multilevel facet degeneration which appears most pronounced in the lower lumbar spine.  There is probably mild dorsal disc narrowing at L3-L4.     Bone mineralization is age-appropriate.        IMPRESSION:     1.  Multilevel facet degeneration and minor disc degeneration.  2.  Mild lumbar dextroscoliosis.             Exam Ended: 05/05/22 12:11 PM Last Resulted: 05/05/22 12:13 PM           Thank you Tomasa Farmer PA-C for allowing me to participate in caring for your patient.

## 2022-06-02 NOTE — Clinical Note
Beto Randolph,  We saw Fozia back in the office again for her lumbar spine pain.  I agree with your MRI order.  I did document my findings accordingly, hopefully they will approve the MRI as insurance screening has been much more strict lately with regard to spine studies. I also placed a referral to physiatry for consideration of TRINH pending lumbar spine MRI findings. Hope you are well! L

## 2022-06-21 ENCOUNTER — APPOINTMENT (OUTPATIENT)
Dept: PHYSICAL MEDICINE AND REHAB | Facility: MEDICAL CENTER | Age: 27
End: 2022-06-21
Payer: COMMERCIAL

## 2022-07-14 DIAGNOSIS — M41.80 DEXTROSCOLIOSIS: ICD-10-CM

## 2022-07-14 DIAGNOSIS — M54.16 LUMBAR RADICULOPATHY, RIGHT: ICD-10-CM

## 2022-07-18 ENCOUNTER — OFFICE VISIT (OUTPATIENT)
Dept: URGENT CARE | Facility: CLINIC | Age: 27
End: 2022-07-18
Payer: COMMERCIAL

## 2022-07-18 ENCOUNTER — HOSPITAL ENCOUNTER (OUTPATIENT)
Facility: MEDICAL CENTER | Age: 27
End: 2022-07-18
Attending: PHYSICIAN ASSISTANT
Payer: COMMERCIAL

## 2022-07-18 VITALS
WEIGHT: 181 LBS | DIASTOLIC BLOOD PRESSURE: 70 MMHG | BODY MASS INDEX: 27.43 KG/M2 | HEART RATE: 64 BPM | HEIGHT: 68 IN | SYSTOLIC BLOOD PRESSURE: 128 MMHG | RESPIRATION RATE: 16 BRPM | TEMPERATURE: 98.3 F | OXYGEN SATURATION: 97 %

## 2022-07-18 DIAGNOSIS — N76.0 ACUTE VAGINITIS: ICD-10-CM

## 2022-07-18 LAB
APPEARANCE UR: CLEAR
BILIRUB UR STRIP-MCNC: NEGATIVE MG/DL
COLOR UR AUTO: YELLOW
GLUCOSE UR STRIP.AUTO-MCNC: NEGATIVE MG/DL
INT CON NEG: NORMAL
INT CON POS: NORMAL
KETONES UR STRIP.AUTO-MCNC: NEGATIVE MG/DL
LEUKOCYTE ESTERASE UR QL STRIP.AUTO: NEGATIVE
NITRITE UR QL STRIP.AUTO: NEGATIVE
PH UR STRIP.AUTO: 6 [PH] (ref 5–8)
POC URINE PREGNANCY TEST: NEGATIVE
PROT UR QL STRIP: NEGATIVE MG/DL
RBC UR QL AUTO: NORMAL
SP GR UR STRIP.AUTO: 1
UROBILINOGEN UR STRIP-MCNC: 0.2 MG/DL

## 2022-07-18 PROCEDURE — 81025 URINE PREGNANCY TEST: CPT | Performed by: PHYSICIAN ASSISTANT

## 2022-07-18 PROCEDURE — 87510 GARDNER VAG DNA DIR PROBE: CPT

## 2022-07-18 PROCEDURE — 99213 OFFICE O/P EST LOW 20 MIN: CPT | Performed by: PHYSICIAN ASSISTANT

## 2022-07-18 PROCEDURE — 81002 URINALYSIS NONAUTO W/O SCOPE: CPT | Performed by: PHYSICIAN ASSISTANT

## 2022-07-18 PROCEDURE — 87480 CANDIDA DNA DIR PROBE: CPT

## 2022-07-18 PROCEDURE — 87660 TRICHOMONAS VAGIN DIR PROBE: CPT

## 2022-07-18 RX ORDER — FLUCONAZOLE 150 MG/1
TABLET ORAL
Qty: 2 TABLET | Refills: 0 | Status: SHIPPED | OUTPATIENT
Start: 2022-07-18 | End: 2022-07-26

## 2022-07-18 ASSESSMENT — ENCOUNTER SYMPTOMS
FLANK PAIN: 0
NAUSEA: 0
VOMITING: 0
ABDOMINAL PAIN: 0
VAGINITIS: 1
CHILLS: 0
FEVER: 0

## 2022-07-18 NOTE — PROGRESS NOTES
"Subjective:   Fozia Nichole  is a 27 y.o. female who presents for Yeast Infection (Possible yeast infection or BV, discomfort, vaginal itch, odor. The patient recently finished her period. Onset Saturday. Tx: None. )      Vaginitis  The patient's primary symptoms include genital itching and a genital odor. This is a new problem. The current episode started in the past 7 days. Pertinent negatives include no abdominal pain, chills, dysuria, fever, flank pain, frequency, hematuria, nausea, rash, urgency or vomiting.   Patient presents urgent care complaining of last 48 hours of vaginal itching and abnormal odor.  She notes finishing up menstrual period and difficult to discern if there is abnormal discharge.  She denies dysuria frequency urgency or hematuria.  She denies recent medications or antibiotics.  She denies concern for STI.  She denies fevers chills nausea vomiting, denies flank or abdominal pain.     Review of Systems   Constitutional: Negative for chills and fever.   Gastrointestinal: Negative for abdominal pain, nausea and vomiting.   Genitourinary: Negative for dysuria, flank pain, frequency, hematuria and urgency.        Abnormal vaginal discharge   Skin: Negative for rash.       No Known Allergies     Objective:   /70 (BP Location: Left arm, Patient Position: Sitting, BP Cuff Size: Adult)   Pulse 64   Temp 36.8 °C (98.3 °F) (Temporal)   Resp 16   Ht 1.727 m (5' 8\")   Wt 82.1 kg (181 lb)   SpO2 97%   BMI 27.52 kg/m²     Physical Exam  Vitals and nursing note reviewed.   Constitutional:       General: She is not in acute distress.     Appearance: She is well-developed. She is not diaphoretic.   HENT:      Head: Normocephalic and atraumatic.      Right Ear: External ear normal.      Left Ear: External ear normal.      Nose: Nose normal.   Eyes:      General: Lids are normal. No scleral icterus.        Right eye: No discharge.         Left eye: No discharge.      Conjunctiva/sclera: " Conjunctivae normal.   Pulmonary:      Effort: Pulmonary effort is normal. No respiratory distress.   Abdominal:      Tenderness: There is no right CVA tenderness or left CVA tenderness.      Comments: Pelvic exam deferred by patient for self swab   Musculoskeletal:         General: Normal range of motion.      Cervical back: Neck supple.   Skin:     General: Skin is warm and dry.      Coloration: Skin is not pale.      Findings: No erythema.   Neurological:      Mental Status: She is alert and oriented to person, place, and time. She is not disoriented.   Psychiatric:         Speech: Speech normal.         Behavior: Behavior normal.       POCT HCG - NEG  POCT UA - NEG / NORMAL (See chart)    Vag path pending    Assessment/Plan:   1. Acute vaginitis  - POCT Urinalysis  - POCT Pregnancy  - VAGINAL PATHOGENS DNA PANEL; Future  - fluconazole (DIFLUCAN) 150 MG tablet; Take one tab PO day #1, wait 4 days, if still w/ s/sx take 2nd tab PO  Dispense: 2 Tablet; Refill: 0    Supportive care is reviewed with patient/caregiver - recommend to push PO fluids and electrolytes, will treat now with Diflucan, monitor for improved symptoms, will update vaginal pathogens testing results via MyChart and direct treatment accordingly  Return to clinic with lack of resolution or progression of symptoms.      I have worn an N95 mask, gloves and eye protection for the entire encounter with this patient.     Differential diagnosis, natural history, supportive care, and indications for immediate follow-up discussed.

## 2022-07-19 DIAGNOSIS — N76.0 ACUTE VAGINITIS: ICD-10-CM

## 2022-07-19 LAB
CANDIDA DNA VAG QL PROBE+SIG AMP: NEGATIVE
G VAGINALIS DNA VAG QL PROBE+SIG AMP: NEGATIVE
T VAGINALIS DNA VAG QL PROBE+SIG AMP: NEGATIVE

## 2022-07-26 ENCOUNTER — OFFICE VISIT (OUTPATIENT)
Dept: PHYSICAL MEDICINE AND REHAB | Facility: MEDICAL CENTER | Age: 27
End: 2022-07-26
Payer: COMMERCIAL

## 2022-07-26 VITALS
DIASTOLIC BLOOD PRESSURE: 80 MMHG | SYSTOLIC BLOOD PRESSURE: 122 MMHG | WEIGHT: 182 LBS | BODY MASS INDEX: 27.58 KG/M2 | OXYGEN SATURATION: 98 % | TEMPERATURE: 97.5 F | HEART RATE: 76 BPM | HEIGHT: 68 IN

## 2022-07-26 DIAGNOSIS — M54.16 LUMBAR RADICULOPATHY: ICD-10-CM

## 2022-07-26 DIAGNOSIS — M47.816 LUMBAR SPONDYLOSIS: ICD-10-CM

## 2022-07-26 DIAGNOSIS — M53.3 SACROILIAC JOINT DYSFUNCTION: ICD-10-CM

## 2022-07-26 PROCEDURE — 99204 OFFICE O/P NEW MOD 45 MIN: CPT | Performed by: PHYSICAL MEDICINE & REHABILITATION

## 2022-07-26 RX ORDER — ACETAMINOPHEN 500 MG
500-1000 TABLET ORAL EVERY 6 HOURS PRN
COMMUNITY
End: 2023-03-01

## 2022-07-26 ASSESSMENT — PATIENT HEALTH QUESTIONNAIRE - PHQ9
CLINICAL INTERPRETATION OF PHQ2 SCORE: 3
5. POOR APPETITE OR OVEREATING: 0 - NOT AT ALL
SUM OF ALL RESPONSES TO PHQ QUESTIONS 1-9: 7

## 2022-07-26 ASSESSMENT — PAIN SCALES - GENERAL: PAINLEVEL: 7=MODERATE-SEVERE PAIN

## 2022-07-26 NOTE — PROGRESS NOTES
New patient note    Physiatry (physical medicine and  Rehabilitation), interventional spine and sports medicine    Date of Service: 7/26/2022    Chief complaint:   Chief Complaint   Patient presents with   • New Patient     Back pain       HISTORY    HPI: Fozia Nichole 27 y.o. female with a history of dislocated patella and meniscal tear who presents today for evaluation of low back and right leg pain.    From what she reports, she has been having symptoms since around January 2021.  At that time, she felt pain in the right posterior thigh.  Some tingling in the right foot.  This is intermittent.  Treated this with ice, heat and ibuprofen.  Taking tylenol up to 1000mg twice a day.    She was seen by a PCP who suggested that she wear inserts.  The orthotics did not help.    She has seen a chiropractor.  Some help.  She has been seen at [facility] with Dr. Gu.    No physical therapy, but she has done home exercises and reports that things seem to be worse.    Sneezing makes back and leg pain worse on the right.      Her job as  involves computer work, walking and stairs.  Minimal lifting.    Medical records review:  I reviewed the note from the referring provider Jus Gu M.D. dated 05/05/2022    Previous treatments:    Physical Therapy: Yes, home program    Medications the patient is tried: tylenol, history of upset stomach with ibuprofen; steroid, flexeril, meloxicam.  No particular help.  Tylenol is somewhat helpful.    Previous interventions: none    Previous surgeries to relieve the above pain:  none      ROS:   Red Flags ROS:   Fever, Chills, Sweats: Denies  Involuntary Weight Loss: Denies  Bladder Incontinence: Denies  Bowel Incontinence: Denies  Saddle Anesthesia: Denies    All other systems reviewed and negative.       PMHx:   Past Medical History:   Diagnosis Date   • Acne vulgaris 1/22/2019   • Migraine headache    • Migraine without aura 1/21/2014      "ICD-10 transition   • Mild intermittent asthma 4/13/2021   • Ulcer        PSHx:   Past Surgical History:   Procedure Laterality Date   • TONSILLECTOMY     • TYMPANOPLASTY         Family history   Family History   Problem Relation Age of Onset   • Breast Cancer Mother 52   • Cancer Maternal Grandmother         Ovarian CA    • Heart Disease Maternal Grandfather    • Thyroid Sister         Hyperthyroid    • Stroke Neg Hx          Medications:   Current Outpatient Medications   Medication   • acetaminophen (TYLENOL) 500 MG Tab   • albuterol 108 (90 Base) MCG/ACT Aero Soln inhalation aerosol     No current facility-administered medications for this visit.       Allergies:   No Known Allergies    Social Hx:   Social History     Socioeconomic History   • Marital status: Single     Spouse name: Not on file   • Number of children: Not on file   • Years of education: Not on file   • Highest education level: Not on file   Occupational History   • Not on file   Tobacco Use   • Smoking status: Never Smoker   • Smokeless tobacco: Never Used   Vaping Use   • Vaping Use: Never used   Substance and Sexual Activity   • Alcohol use: Yes     Comment: socially    • Drug use: Not Currently     Comment: Not currently at this time 7/18/2022.   • Sexual activity: Yes     Partners: Female   Other Topics Concern   • Not on file   Social History Narrative   • Not on file     Social Determinants of Health     Financial Resource Strain: Not on file   Food Insecurity: Not on file   Transportation Needs: Not on file   Physical Activity: Not on file   Stress: Not on file   Social Connections: Not on file   Intimate Partner Violence: Not on file   Housing Stability: Not on file         EXAMINATION     Physical Exam:   Vitals: /80 (BP Location: Right arm, Patient Position: Sitting, BP Cuff Size: Adult long)   Pulse 76   Temp 36.4 °C (97.5 °F) (Temporal)   Ht 1.727 m (5' 8\")   Wt 82.6 kg (182 lb)   SpO2 98%     Constitutional:   Body " Habitus: Body mass index is 27.67 kg/m².  Cooperation: Fully cooperates with exam  Appearance: Well-groomed, well-nourished, not disheveled, no acute distress    Eyes: No scleral icterus, no proptosis     ENT -no obvious auditory deficits, wearing a face mask    Skin -no rashes or lesions noted     Respiratory-  breathing comfortable on room air, no audible wheezing    Cardiovascular- capillary refills less than 2 seconds. No lower extremity edema is noted.     Psychiatric- alert and oriented ×3. Normal affect.     Gait - normal gait, no use of ambulatory device, nonantalgic. The patient can toe walk with ease. The patient can heel walk with ease..     Musculoskeletal -   Cervical spine   Inspection: No deformities of the skin over the cervical spine. No rashes or lesions.    full  A/P ROM in all directions, without  pain        Thoracic/Lumbar Spine/Sacral Spine/Hips   Inspection: No evidence of atrophy in bilateral lower extremities throughout     ROM: full  AROM with flexion, extension, lateral flexion, and rotation bilaterally, with pain in extension    Palpation:   No tenderness to palpation in midline at T1-T12 levels. Mild tenderness over the right lumbar paraspinals  palpation over SI joint: positive bilaterally, left greater than right  palpation over buttock: negative bilaterally    palpation in hip or over the greater trochanters: negative bilaterally      Lumbar spine Special tests  Neuro tension  Straight leg test positive right, negative left    Slump test positive right, negative left      HIP  Range of motion in the hips is within normal limits in internal and external rotation bilaterally    SI joint tests  Observation patient sits on one buttocks: Negative  SOL test negative bilaterally       Neuro       Key points for the international standards for neurological classification of spinal cord injury (ISNCSCI) to light touch.     Dermatome R L   C4 2 2   C5 2 2   C6 2 2   C7 2 2   C8 2 2   T1 2 2    T2 2 2   L2 2 2   L3 2 2   L4 2 2   L5 1 2   S1 2 2   S2 2 2         Motor Exam Upper Extremities   ? Myotome R L   Shoulder flexion C5 5 5   Elbow flexion C5 5 5   Wrist extension C6 5 5   Elbow extension C7 5 5   Finger flexion C8 5 5   Finger abduction T1 5 5         Motor Exam Lower Extremities    ? Myotome R L   Hip flexion L2 5 5   Knee extension L3 5 5   Ankle dorsiflexion L4 5 5   Toe extension L5 5 5   Ankle plantarflexion S1 5 5       Tinel's is negative at the wrists and elbows bilaterally  Clonus of the ankle negative bilaterally     Reflexes  ?  R L   Biceps  2+ 2+   Brachioradialis  2+ 2+   Patella  2+ 2+   Achilles   2+ 2+       MEDICAL DECISION MAKING    Medical records review: see under HPI section.     DATA    Labs:   Lab Results   Component Value Date/Time    SODIUM 138 11/20/2013 12:56 AM    POTASSIUM 4.1 11/20/2013 12:56 AM    CHLORIDE 105 11/20/2013 12:56 AM    CO2 23 11/20/2013 12:56 AM    ANION 10.0 11/20/2013 12:56 AM    GLUCOSE 102 (H) 11/20/2013 12:56 AM    BUN 12 11/20/2013 12:56 AM    CREATININE 0.72 11/20/2013 12:56 AM    CALCIUM 9.5 11/20/2013 12:56 AM    ASTSGOT 12 11/20/2013 12:56 AM    ALTSGPT 12 11/20/2013 12:56 AM    TBILIRUBIN 0.3 11/20/2013 12:56 AM    ALBUMIN 4.4 11/20/2013 12:56 AM    TOTPROTEIN 7.6 11/20/2013 12:56 AM    GLOBULIN 3.2 11/20/2013 12:56 AM    AGRATIO 1.4 11/20/2013 12:56 AM       No results found for: PROTHROMBTM, INR     Lab Results   Component Value Date/Time    WBC 13.5 (H) 11/20/2013 12:56 AM    RBC 4.53 11/20/2013 12:56 AM    HEMOGLOBIN 13.9 11/20/2013 12:56 AM    HEMATOCRIT 40.8 11/20/2013 12:56 AM    MCV 90.0 11/20/2013 12:56 AM    MCH 30.7 11/20/2013 12:56 AM    MCHC 34.1 11/20/2013 12:56 AM    MPV 8.1 11/20/2013 12:56 AM    NEUTSPOLYS 41.4 (L) 11/20/2013 12:56 AM    LYMPHOCYTES 45.9 (H) 11/20/2013 12:56 AM    MONOCYTES 7.0 11/20/2013 12:56 AM    EOSINOPHILS 4.8 11/20/2013 12:56 AM    BASOPHILS 0.9 11/20/2013 12:56 AM        No results found for: HBA1C      Imaging: I personally reviewed following images, these are my reads  Xray lumbar spine 0/05/2022  There is note of lumbar facet arthropathy.  Note of mild lumbar dextroscoliosis.      IMAGING radiology reads. I reviewed the following radiology reads                             Results for orders placed in visit on 05/05/22    DX-LUMBAR SPINE-2 OR 3 VIEWS    Impression  1.  Multilevel facet degeneration and minor disc degeneration.  2.  Mild lumbar dextroscoliosis.                         Diagnosis   Visit Diagnoses     ICD-10-CM   1. Lumbar radiculopathy  M54.16   2. Sacroiliac joint dysfunction  M53.3   3. Lumbar spondylosis  M47.816           ASSESSMENT:  Fozia Nichole 27 y.o. female seen for above     Fozia was seen today for new patient.    Diagnoses and all orders for this visit:    Lumbar radiculopathy  -     Referral to Physical Therapy  -     Cancel: MR-LUMBAR SPINE-W/O; Future  -     MR-LUMBAR SPINE-W/O; Future    Sacroiliac joint dysfunction  -     Referral to Physical Therapy    Lumbar spondylosis  -     Referral to Physical Therapy          1. Discussed that she has been getting worse with home exercise program that she has been doing for the last 6 weeks.  Trial formal physical therapy, if her schedule will allow and she tolerates.  2. She does have findings that suggest lumbar radiculopathy and some sacroiliac joint dysfunction.  MRI lumbar spine ordered to be done at [facility].  She has also tried and failed conservative care.  It has been about a year since she started to seek care for this problem and has had medication trial with meloxicam, steroids without relief.  MRI will allow for identification of treatment targets.  3. Discussed that she can continue to take tylenol 1000mg twice a day prn      Follow-up: Return in about 2 months (around 9/26/2022), or if symptoms worsen or fail to improve.      Thank you very much for asking me to participate in Fozia Hassan Dionisio's care.  Please  contact me with any questions or concerns.    Please note that this dictation was created using voice recognition software. I have made every reasonable attempt to correct obvious errors but there may be errors of grammar and content that I may have overlooked prior to finalization of this note.      Darren Santiago MD  Physical Medicine and Rehabilitation  Interventional Spine and Sports Physiatry  [facility]       Jus Keith M.D.

## 2022-09-30 ENCOUNTER — OFFICE VISIT (OUTPATIENT)
Dept: URGENT CARE | Facility: CLINIC | Age: 27
End: 2022-09-30
Payer: COMMERCIAL

## 2022-09-30 ENCOUNTER — HOSPITAL ENCOUNTER (OUTPATIENT)
Facility: MEDICAL CENTER | Age: 27
End: 2022-09-30
Attending: PHYSICIAN ASSISTANT
Payer: COMMERCIAL

## 2022-09-30 VITALS
OXYGEN SATURATION: 99 % | SYSTOLIC BLOOD PRESSURE: 118 MMHG | HEIGHT: 68 IN | TEMPERATURE: 97.6 F | RESPIRATION RATE: 16 BRPM | BODY MASS INDEX: 26.52 KG/M2 | WEIGHT: 175 LBS | DIASTOLIC BLOOD PRESSURE: 82 MMHG | HEART RATE: 80 BPM

## 2022-09-30 DIAGNOSIS — J02.9 PHARYNGITIS, UNSPECIFIED ETIOLOGY: ICD-10-CM

## 2022-09-30 LAB
INT CON NEG: NORMAL
INT CON POS: NORMAL
S PYO AG THROAT QL: NEGATIVE

## 2022-09-30 PROCEDURE — 87070 CULTURE OTHR SPECIMN AEROBIC: CPT

## 2022-09-30 PROCEDURE — 87880 STREP A ASSAY W/OPTIC: CPT | Performed by: PHYSICIAN ASSISTANT

## 2022-09-30 PROCEDURE — 99213 OFFICE O/P EST LOW 20 MIN: CPT | Performed by: PHYSICIAN ASSISTANT

## 2022-09-30 RX ORDER — DEXAMETHASONE SODIUM PHOSPHATE 10 MG/ML
10 INJECTION INTRAMUSCULAR; INTRAVENOUS ONCE
Status: COMPLETED | OUTPATIENT
Start: 2022-09-30 | End: 2022-09-30

## 2022-09-30 RX ADMIN — DEXAMETHASONE SODIUM PHOSPHATE 10 MG: 10 INJECTION INTRAMUSCULAR; INTRAVENOUS at 18:00

## 2022-10-01 NOTE — PROGRESS NOTES
"Subjective:   Fozia Nichole is a 27 y.o. female who presents for Pharyngitis (X 2 wks, sore throat)      HPI  The patient presents to the Urgent Care with complaints of a sore throat for about 10 days.  The sore throat has improved but not resolved.  She is handling her secretions.  She has a mild cough mostly at night and phlegm in her throat.  She has been drinking tea and numbing spray.  Denies any fever, chest pain, shortness of breath.      Medications:    acetaminophen Tabs  albuterol Aers    Allergies: Patient has no known allergies.    Problem List: Fozia Nichole does not have any pertinent problems on file.    Surgical History:  Past Surgical History:   Procedure Laterality Date    TONSILLECTOMY      TYMPANOPLASTY         Past Social Hx: Fozia Nichole  reports that she has never smoked. She has never used smokeless tobacco. She reports current alcohol use. She reports that she does not currently use drugs.     Past Family Hx:  Fozia Nichole family history includes Breast Cancer (age of onset: 52) in her mother; Cancer in her maternal grandmother; Heart Disease in her maternal grandfather; Thyroid in her sister.     Problem list, medications, and allergies reviewed by myself today in Epic.     Objective:     /82 (BP Location: Left arm, Patient Position: Sitting, BP Cuff Size: Adult long)   Pulse 80   Temp 36.4 °C (97.6 °F) (Temporal)   Resp 16   Ht 1.727 m (5' 8\")   Wt 79.4 kg (175 lb)   SpO2 99%   BMI 26.61 kg/m²     Physical Exam  Vitals reviewed.   Constitutional:       General: She is not in acute distress.     Appearance: Normal appearance. She is not ill-appearing or toxic-appearing.   HENT:      Mouth/Throat:      Mouth: Mucous membranes are moist.      Pharynx: Posterior oropharyngeal erythema present. No oropharyngeal exudate.   Eyes:      Conjunctiva/sclera: Conjunctivae normal.      Pupils: Pupils are equal, round, and reactive to light. "   Cardiovascular:      Rate and Rhythm: Normal rate and regular rhythm.      Heart sounds: Normal heart sounds.   Pulmonary:      Effort: Pulmonary effort is normal. No respiratory distress.      Breath sounds: Normal breath sounds. No wheezing, rhonchi or rales.   Musculoskeletal:      Cervical back: Neck supple.   Lymphadenopathy:      Cervical: Cervical adenopathy present.      Right cervical: Superficial cervical adenopathy present.      Left cervical: Superficial cervical adenopathy present.   Skin:     General: Skin is warm and dry.   Neurological:      General: No focal deficit present.      Mental Status: She is alert and oriented to person, place, and time.   Psychiatric:         Mood and Affect: Mood normal.         Behavior: Behavior normal.     Strep A: Negative     Diagnosis and associated orders:     1. Pharyngitis, unspecified etiology  - CULTURE THROAT; Future  - POCT Rapid Strep A  - dexamethasone (DECADRON) injection (check route below) 10 mg     Comments/MDM:     Throat culture pending.  Discussed warm salt water gargles, soft foods, cool liquids, ibuprofen and Tylenol for any pain or fevers.   Return to the urgent care if symptoms are not improving or any worsening symptoms or concerns.      I personally reviewed prior external notes and test results pertinent to today's visit. Pathogenesis of diagnosis discussed including typical length and natural progression. Supportive care, natural history, differential diagnoses, and indications for immediate follow-up discussed. Patient expresses understanding and agrees to plan. Patient denies any other questions or concerns.     Follow-up with the primary care physician for recheck, reevaluation, and consideration of further management.    Please note that this dictation was created using voice recognition software. I have made a reasonable attempt to correct obvious errors, but I expect that there are errors of grammar and possibly content that I did  not discover before finalizing the note.    This note was electronically signed by Candelario Canela PA-C

## 2022-10-03 LAB
BACTERIA SPEC RESP CULT: NORMAL
SIGNIFICANT IND 70042: NORMAL
SITE SITE: NORMAL
SOURCE SOURCE: NORMAL

## 2022-10-26 ENCOUNTER — HOSPITAL ENCOUNTER (OUTPATIENT)
Dept: RADIOLOGY | Facility: MEDICAL CENTER | Age: 27
End: 2022-10-26
Attending: PHYSICAL MEDICINE & REHABILITATION
Payer: COMMERCIAL

## 2022-10-27 ENCOUNTER — OFFICE VISIT (OUTPATIENT)
Dept: PHYSICAL MEDICINE AND REHAB | Facility: MEDICAL CENTER | Age: 27
End: 2022-10-27
Payer: COMMERCIAL

## 2022-10-27 VITALS
HEIGHT: 68 IN | BODY MASS INDEX: 28.22 KG/M2 | OXYGEN SATURATION: 95 % | WEIGHT: 186.2 LBS | DIASTOLIC BLOOD PRESSURE: 86 MMHG | HEART RATE: 78 BPM | TEMPERATURE: 97.9 F | SYSTOLIC BLOOD PRESSURE: 120 MMHG

## 2022-10-27 DIAGNOSIS — M54.16 LUMBAR RADICULOPATHY: ICD-10-CM

## 2022-10-27 DIAGNOSIS — M51.36 BULGE OF LUMBAR DISC WITHOUT MYELOPATHY: ICD-10-CM

## 2022-10-27 DIAGNOSIS — E55.9 VITAMIN D INSUFFICIENCY: ICD-10-CM

## 2022-10-27 PROCEDURE — 99214 OFFICE O/P EST MOD 30 MIN: CPT | Performed by: PHYSICAL MEDICINE & REHABILITATION

## 2022-10-27 RX ORDER — AMITRIPTYLINE HYDROCHLORIDE 10 MG/1
40 TABLET, FILM COATED ORAL NIGHTLY
Qty: 120 TABLET | Refills: 1 | Status: SHIPPED | OUTPATIENT
Start: 2022-11-26 | End: 2023-01-25

## 2022-10-27 RX ORDER — AMITRIPTYLINE HYDROCHLORIDE 10 MG/1
TABLET, FILM COATED ORAL
Qty: 78 TABLET | Refills: 0 | Status: SHIPPED | OUTPATIENT
Start: 2022-10-27 | End: 2022-11-26

## 2022-10-27 RX ORDER — VITAMIN B COMPLEX
1000 TABLET ORAL DAILY
COMMUNITY

## 2022-10-27 ASSESSMENT — PAIN SCALES - GENERAL: PAINLEVEL: 6=MODERATE PAIN

## 2022-10-27 ASSESSMENT — PATIENT HEALTH QUESTIONNAIRE - PHQ9
CLINICAL INTERPRETATION OF PHQ2 SCORE: 2
SUM OF ALL RESPONSES TO PHQ QUESTIONS 1-9: 5
5. POOR APPETITE OR OVEREATING: 0 - NOT AT ALL

## 2022-10-27 NOTE — PROGRESS NOTES
Follow-up patient note    Physiatry (physical medicine and  Rehabilitation), interventional spine and sports medicine    Date of Service: 10/27/2022    Chief complaint:   Chief Complaint   Patient presents with    Follow-Up     Leg pain       HISTORY    HPI: Fozia Nichole 27 y.o. female with a history of dislocated patella and meniscal tear who presents today for evaluation of low back and right leg pain.    Since her last visit, Fozia reports that she has had no significant improvement with PT and has been discharged a few weeks ago.  Pain continues to be constant and is 6-7/10 on the NRS.      Pain is worse with standing, the right leg is painful.  Sitting or walking is not particularly painful.      Tingling in the right foot persists, intermittently.  Taking tylenol every few days,but this is not particularly helpful.    She sees the chiropractor every few weeks.    Doing exercises at home, but not feeling like this improves.    Her job as  involves computer work, walking and stairs.  Minimal lifting.    Medical records review:  I reviewed the note from the referring provider Jus Gu M.D. dated 05/05/2022    Previous treatments:    Physical Therapy: Yes, home program    Medications the patient is tried: tylenol, history of upset stomach with ibuprofen; steroid, flexeril, meloxicam.  No particular help.  Tylenol is somewhat helpful.    Previous interventions: none    Previous surgeries to relieve the above pain:  none      ROS:   Red Flags ROS:   Fever, Chills, Sweats: Denies  Involuntary Weight Loss: Denies  Bladder Incontinence: Denies  Bowel Incontinence: Denies  Saddle Anesthesia: Denies    All other systems reviewed and negative.       PMHx:   Past Medical History:   Diagnosis Date    Acne vulgaris 1/22/2019    Migraine headache     Migraine without aura 1/21/2014     ICD-10 transition    Mild intermittent asthma 4/13/2021    Ulcer        PSHx:   Past Surgical  "History:   Procedure Laterality Date    TONSILLECTOMY      TYMPANOPLASTY         Family history   Family History   Problem Relation Age of Onset    Breast Cancer Mother 52    Cancer Maternal Grandmother         Ovarian CA     Heart Disease Maternal Grandfather     Thyroid Sister         Hyperthyroid     Stroke Neg Hx          Medications:   Current Outpatient Medications   Medication    vitamin D3 (CHOLECALCIFEROL) 1000 Unit (25 mcg) Tab    amitriptyline (ELAVIL) 10 MG Tab    [START ON 11/26/2022] amitriptyline (ELAVIL) 10 MG Tab    acetaminophen (TYLENOL) 500 MG Tab    albuterol 108 (90 Base) MCG/ACT Aero Soln inhalation aerosol     No current facility-administered medications for this visit.       Allergies:   No Known Allergies    Social Hx:   Social History     Socioeconomic History    Marital status: Single     Spouse name: Not on file    Number of children: Not on file    Years of education: Not on file    Highest education level: Not on file   Occupational History    Not on file   Tobacco Use    Smoking status: Never    Smokeless tobacco: Never   Vaping Use    Vaping Use: Never used   Substance and Sexual Activity    Alcohol use: Yes     Comment: socially     Drug use: Not Currently     Comment: Not currently at this time 7/18/2022.    Sexual activity: Yes     Partners: Female   Other Topics Concern    Not on file   Social History Narrative    Not on file     Social Determinants of Health     Financial Resource Strain: Not on file   Food Insecurity: Not on file   Transportation Needs: Not on file   Physical Activity: Not on file   Stress: Not on file   Social Connections: Not on file   Intimate Partner Violence: Not on file   Housing Stability: Not on file         EXAMINATION     Physical Exam:   Vitals: /86 (BP Location: Right arm, Patient Position: Sitting, BP Cuff Size: Adult long)   Pulse 78   Temp 36.6 °C (97.9 °F) (Temporal)   Ht 1.727 m (5' 8\")   Wt 84.5 kg (186 lb 3.2 oz)   SpO2 95% "     Constitutional:   Body Habitus: Body mass index is 28.31 kg/m².  Cooperation: Fully cooperates with exam  Appearance: Well-groomed, well-nourished, not disheveled, no acute distress    Eyes: No scleral icterus, no proptosis     ENT -no obvious auditory deficits, wearing a face mask    Skin -no rashes or lesions noted     Respiratory-  breathing comfortable on room air, no audible wheezing    Cardiovascular- capillary refills less than 2 seconds. No lower extremity edema is noted.     Psychiatric- alert and oriented ×3. Normal affect.     Gait - normal gait, no use of ambulatory device, nonantalgic.      Musculoskeletal -     Thoracic/Lumbar Spine/Sacral Spine/Hips   Inspection: No evidence of atrophy in bilateral lower extremities throughout   Lumbar spine Special tests  Neuro tension  Seated straight leg test positive right, negative left    Slump test positive right, negative left      HIP  Range of motion in the hips is within normal limits in internal and external rotation bilaterally    SI joint tests  Observation patient sits on one buttocks: Negative  SOL test negative bilaterally       Neuro       Key points for the international standards for neurological classification of spinal cord injury (ISNCSCI) to light touch.     Dermatome R L   L2 2 2   L3 2 2   L4 2 2   L5 1 2   S1 2 2   S2 2 2       Motor Exam Lower Extremities    ? Myotome R L   Hip flexion L2 5 5   Knee extension L3 5 5   Ankle dorsiflexion L4 5 5   Toe extension L5 5 5   Ankle plantarflexion S1 5 5          Reflexes  ?  R L   Patella  2+ 2+   Achilles   2+ 2+       MEDICAL DECISION MAKING    Medical records review: see under HPI section.     DATA    Labs:   Lab Results   Component Value Date/Time    SODIUM 138 11/20/2013 12:56 AM    POTASSIUM 4.1 11/20/2013 12:56 AM    CHLORIDE 105 11/20/2013 12:56 AM    CO2 23 11/20/2013 12:56 AM    ANION 10.0 11/20/2013 12:56 AM    GLUCOSE 102 (H) 11/20/2013 12:56 AM    BUN 12 11/20/2013 12:56 AM     CREATININE 0.72 11/20/2013 12:56 AM    CALCIUM 9.5 11/20/2013 12:56 AM    ASTSGOT 12 11/20/2013 12:56 AM    ALTSGPT 12 11/20/2013 12:56 AM    TBILIRUBIN 0.3 11/20/2013 12:56 AM    ALBUMIN 4.4 11/20/2013 12:56 AM    TOTPROTEIN 7.6 11/20/2013 12:56 AM    GLOBULIN 3.2 11/20/2013 12:56 AM    AGRATIO 1.4 11/20/2013 12:56 AM       No results found for: PROTHROMBTM, INR     Lab Results   Component Value Date/Time    WBC 13.5 (H) 11/20/2013 12:56 AM    RBC 4.53 11/20/2013 12:56 AM    HEMOGLOBIN 13.9 11/20/2013 12:56 AM    HEMATOCRIT 40.8 11/20/2013 12:56 AM    MCV 90.0 11/20/2013 12:56 AM    MCH 30.7 11/20/2013 12:56 AM    MCHC 34.1 11/20/2013 12:56 AM    MPV 8.1 11/20/2013 12:56 AM    NEUTSPOLYS 41.4 (L) 11/20/2013 12:56 AM    LYMPHOCYTES 45.9 (H) 11/20/2013 12:56 AM    MONOCYTES 7.0 11/20/2013 12:56 AM    EOSINOPHILS 4.8 11/20/2013 12:56 AM    BASOPHILS 0.9 11/20/2013 12:56 AM        No results found for: HBA1C     Imaging: I personally reviewed following images, these are my reads  MRI lumbar spine 10/21/2022  At L1-2, no central or foraminal stenosis  At L2-3, no central or foraminal stenosis  At L3-4, no central or foraminal stenosis  At L4-5, small disc bulge is noted without central or foraminal stenosis.  There is mild facet arthropathy  At L5-S1, no central or foraminal stenosis      Xray lumbar spine 0/05/2022  There is note of lumbar facet arthropathy.  Note of mild lumbar dextroscoliosis.      IMAGING radiology reads. I reviewed the following radiology reads    MRI lumbar spine 10/21/2022  [facility]  Impression: Mild degenerative disc space narrowing at L4-5 with small disc bulge.  No significant central canal stenosis or neural foraminal narrowing.                             Results for orders placed in visit on 05/05/22    DX-LUMBAR SPINE-2 OR 3 VIEWS    Impression  1.  Multilevel facet degeneration and minor disc degeneration.  2.  Mild lumbar dextroscoliosis.                         Diagnosis   Visit Diagnoses      ICD-10-CM   1. Lumbar radiculopathy  M54.16   2. Bulge of lumbar disc without myelopathy  M51.36   3. Vitamin D insufficiency  E55.9             ASSESSMENT:  Fozia Nichole 27 y.o. female seen for above     Fozia was seen today for follow-up.    Diagnoses and all orders for this visit:    Lumbar radiculopathy  -     amitriptyline (ELAVIL) 10 MG Tab; Take 1 Tablet by mouth every evening for 7 days, THEN 2 Tablets every evening for 7 days, THEN 3 Tablets every evening for 7 days, THEN 4 Tablets every evening for 9 days.  -     amitriptyline (ELAVIL) 10 MG Tab; Take 4 Tablets by mouth every evening for 60 days.    Bulge of lumbar disc without myelopathy    Vitamin D insufficiency  -     VITAMIN D 25-HYDROXY        Discussed that her pain has persisted despite physical therapy.  Reviewed findings on MRI of the lumbar spine.    We discussed that she is not done well with physical therapy and the medications that she has had so far including meloxicam and steroids.  We discussed medications as an option as well as injections.  She is not interested in pursuing injections at this time but would consider medication.  In the past she took amitriptyline for headaches and tolerated it well.  We discussed starting with amitriptyline 10 mg titrating once a week to 30 or 40 mg depending on tolerance and improvement.  She will contact the office with any questions or concerns.  We also discussed that she has a history of vitamin D insufficiency.  I have ordered vitamin D level        Follow-up: Return in about 6 weeks (around 12/8/2022).      Thank you very much for asking me to participate in Fozia Nichole's care.  Please contact me with any questions or concerns.    Please note that this dictation was created using voice recognition software. I have made every reasonable attempt to correct obvious errors but there may be errors of grammar and content that I may have overlooked prior to finalization of this  note.      Darren Santiago MD  Physical Medicine and Rehabilitation  Interventional Spine and Sports Physiatry  [facility]     CC: HARJINDER Storm

## 2022-11-02 ENCOUNTER — HOSPITAL ENCOUNTER (OUTPATIENT)
Dept: RADIOLOGY | Facility: MEDICAL CENTER | Age: 27
End: 2022-11-02
Payer: COMMERCIAL

## 2022-12-08 ENCOUNTER — APPOINTMENT (OUTPATIENT)
Dept: PHYSICAL MEDICINE AND REHAB | Facility: MEDICAL CENTER | Age: 27
End: 2022-12-08
Payer: COMMERCIAL

## 2022-12-13 ENCOUNTER — APPOINTMENT (OUTPATIENT)
Dept: PHYSICAL MEDICINE AND REHAB | Facility: MEDICAL CENTER | Age: 27
End: 2022-12-13
Payer: COMMERCIAL

## 2022-12-13 ENCOUNTER — OFFICE VISIT (OUTPATIENT)
Dept: MEDICAL GROUP | Facility: MEDICAL CENTER | Age: 27
End: 2022-12-13
Payer: COMMERCIAL

## 2022-12-13 VITALS
DIASTOLIC BLOOD PRESSURE: 80 MMHG | TEMPERATURE: 97.8 F | OXYGEN SATURATION: 99 % | SYSTOLIC BLOOD PRESSURE: 118 MMHG | BODY MASS INDEX: 28.49 KG/M2 | WEIGHT: 188 LBS | RESPIRATION RATE: 16 BRPM | HEIGHT: 68 IN | HEART RATE: 72 BPM

## 2022-12-13 DIAGNOSIS — M79.604 PAIN IN RIGHT LEG: ICD-10-CM

## 2022-12-13 DIAGNOSIS — M41.80 DEXTROSCOLIOSIS: ICD-10-CM

## 2022-12-13 DIAGNOSIS — M54.16 LUMBAR RADICULOPATHY, RIGHT: ICD-10-CM

## 2022-12-13 DIAGNOSIS — L70.0 ACNE VULGARIS: ICD-10-CM

## 2022-12-13 PROCEDURE — 99214 OFFICE O/P EST MOD 30 MIN: CPT | Performed by: PHYSICIAN ASSISTANT

## 2022-12-13 RX ORDER — CLINDAMYCIN PHOSPHATE 10 MG/G
GEL TOPICAL
Qty: 60 G | Refills: 1 | Status: SHIPPED | OUTPATIENT
Start: 2022-12-13 | End: 2023-03-01

## 2022-12-13 RX ORDER — DOXYCYCLINE HYCLATE 100 MG
100 TABLET ORAL 2 TIMES DAILY
Qty: 60 TABLET | Refills: 0 | Status: SHIPPED | OUTPATIENT
Start: 2022-12-13 | End: 2023-01-12

## 2022-12-13 NOTE — PROGRESS NOTES
"Subjective:   Fozia Nichole is a 27 y.o. female here today for     HPI:Patient is here to discuss: Acne  and back pain      Patient is here stating her acne has improved since being seen dermatologist in the last year have also get some Debrox but not as bad  Did see neurosurgery Dr. Kaur reviewed her MRI and told her that he she did not need any follow-up with him to do physical therapy but she has been getting some pain.  Would like to start on amitriptyline      Current medicines (including changes today)  Current Outpatient Medications   Medication Sig Dispense Refill    vitamin D3 (CHOLECALCIFEROL) 1000 Unit (25 mcg) Tab Take 1,000 Units by mouth every day.      amitriptyline (ELAVIL) 10 MG Tab Take 4 Tablets by mouth every evening for 60 days. 120 Tablet 1    acetaminophen (TYLENOL) 500 MG Tab Take 500-1,000 mg by mouth every 6 hours as needed. (Patient not taking: No sig reported)      albuterol 108 (90 Base) MCG/ACT Aero Soln inhalation aerosol INHALE ONE TO TWO PUFFS BY MOUTH EVERY 4 TO 6 HOURS AS NEEDED FOR SHORTNESS OF BREATH (Patient not taking: No sig reported) 8.5 g 1     No current facility-administered medications for this visit.     She  has a past medical history of Acne vulgaris (1/22/2019), Migraine headache, Migraine without aura (1/21/2014), Mild intermittent asthma (4/13/2021), and Ulcer.    She has no past medical history of Cancer (HCC), Diabetes (HCC), Hyperlipidemia, or Hypertension.  Patient has no known allergies.     Social History and Family History were reviewed and updated.    ROS   No headaches, chest pain, no shortness of breath, abdominal pain, nausea, or vomiting.  All other systems were reviewed and are negative or noted as positive in the HPI.       Objective:     /80   Pulse 72   Temp 36.6 °C (97.8 °F) (Temporal)   Resp 16   Ht 1.727 m (5' 8\")   Wt 85.3 kg (188 lb)   SpO2 99%  Body mass index is 28.59 kg/m².     Physical Exam:  General: Patient appears " well-nourished, well-hydrated, nontoxic  HEENT, normocephalic atraumatic, PERRLA, extraocular movements intact, nares are patent and clear  Neck: No visible masses, thyromegaly or abnormalities noted  Cardiovascular.  Sitting comfortably without visible signs of edema  Lungs: No cyanosis noted, nondyspneic  Skin: Well perfused without evidence of rash or lesions  Neurological: Cranial nerves II through XII intact, normal gait  Musculoskeletal: Normal range of motion, normal strength and no deficit noted     Clinical Course/Lab Analysis:        Assessment and Plan:   The following treatment plan was discussed.  Signs and symptoms for which to return were discussed with patient at length.  Patient verbalized understanding.      1. Dextroscoliosis  New and unstable  Patient saw neurosurgery and reviewed MRI that I ordered. Dr Tubbs, he recommends Pt. she is not a surgical candidate  - MR-LUMBAR SPINE-W/O; Future  - meloxicam (MOBIC) 15 MG tablet; Take 1 Tablet by mouth every day.  Dispense: 30 Tablet; Refill: 1     2. Lumbar radiculopathy, right  New and unstable  Patient saw neurosurgery, Dr. Tubbs's group, they reviewed her MRI and told her she is not a surgical candidate and to do physical therapy.  She has done physical therapy but still has significant discomfort.  She is currently seeing Dr. Darren angel with physiatry and will be started on amitriptyline.  If amitriptyline does not work, I may consider duloxetine     3. Pain in right leg  New and unstable  See above.  Physiatry has recommended amitriptyline to help with pain as well as possible epidural and patient will consider this.  MRI has been reviewed as well and does not show indications for surgical intervention     4.  Acne  Chronic and unstable  I am sending clindamycin topical to use twice daily as well as 1 month oral Doxy in addition to her retinol that she already has at home        Followup: 1 to 2 months to see how antibiotics for  bacterial    Please note that this dictation was created using voice recognition software. I have made every reasonable attempt to correct obvious errors, but I expect that there are errors of grammar and possibly content that I did not discover before finalizing the note.

## 2023-01-19 ENCOUNTER — OFFICE VISIT (OUTPATIENT)
Dept: MEDICAL GROUP | Facility: MEDICAL CENTER | Age: 28
End: 2023-01-19
Payer: COMMERCIAL

## 2023-01-19 VITALS
HEART RATE: 67 BPM | OXYGEN SATURATION: 98 % | BODY MASS INDEX: 28.79 KG/M2 | TEMPERATURE: 97.5 F | WEIGHT: 190 LBS | HEIGHT: 68 IN | SYSTOLIC BLOOD PRESSURE: 120 MMHG | RESPIRATION RATE: 16 BRPM | DIASTOLIC BLOOD PRESSURE: 86 MMHG

## 2023-01-19 DIAGNOSIS — Z30.011 ENCOUNTER FOR INITIAL PRESCRIPTION OF CONTRACEPTIVE PILLS: ICD-10-CM

## 2023-01-19 DIAGNOSIS — Z23 NEED FOR VACCINATION: ICD-10-CM

## 2023-01-19 DIAGNOSIS — Z30.9 ENCOUNTER FOR CONTRACEPTIVE MANAGEMENT, UNSPECIFIED TYPE: ICD-10-CM

## 2023-01-19 LAB
INT CON NEG: NORMAL
INT CON POS: NORMAL
POC URINE PREGNANCY TEST: NEGATIVE

## 2023-01-19 PROCEDURE — 99214 OFFICE O/P EST MOD 30 MIN: CPT | Performed by: PHYSICIAN ASSISTANT

## 2023-01-19 PROCEDURE — 81025 URINE PREGNANCY TEST: CPT | Performed by: PHYSICIAN ASSISTANT

## 2023-01-19 RX ORDER — LEVONORGESTREL AND ETHINYL ESTRADIOL 0.1-0.02MG
1 KIT ORAL DAILY
Qty: 84 TABLET | Refills: 3 | Status: SHIPPED | OUTPATIENT
Start: 2023-01-19 | End: 2023-01-19

## 2023-01-19 RX ORDER — LEVONORGESTREL AND ETHINYL ESTRADIOL 0.1-0.02MG
1 KIT ORAL DAILY
Qty: 84 TABLET | Refills: 3 | Status: SHIPPED | OUTPATIENT
Start: 2023-01-19 | End: 2023-12-22

## 2023-01-19 RX ORDER — DOXYCYCLINE HYCLATE 100 MG/1
CAPSULE ORAL
COMMUNITY
Start: 2022-12-30 | End: 2023-03-01

## 2023-01-19 ASSESSMENT — PATIENT HEALTH QUESTIONNAIRE - PHQ9: CLINICAL INTERPRETATION OF PHQ2 SCORE: 0

## 2023-01-20 NOTE — PROGRESS NOTES
"Subjective:     Chief Complaint   Patient presents with    Contraception     Fozia Nichole is a 27 y.o. female here today to follow to Discuss:    HPI:    Patient is here for management.  Has been sexual active with female partner for the last 10 years and has no subjective male partner.  Like to discuss birth control options    Current medicines (including changes today)  Current Outpatient Medications   Medication Sig Dispense Refill    levonorgestrel-ethinyl estradiol (AVIANE) 0.1-20 MG-MCG per tablet Take 1 Tablet by mouth every day. 84 Tablet 3    clindamycin (CLEOCIN T) 1 % Gel Apply topically sparingly twice daily to affected area on the face 60 g 1    vitamin D3 (CHOLECALCIFEROL) 1000 Unit (25 mcg) Tab Take 1,000 Units by mouth every day.      amitriptyline (ELAVIL) 10 MG Tab Take 4 Tablets by mouth every evening for 60 days. 120 Tablet 1    acetaminophen (TYLENOL) 500 MG Tab Take 500-1,000 mg by mouth every 6 hours as needed.      albuterol 108 (90 Base) MCG/ACT Aero Soln inhalation aerosol INHALE ONE TO TWO PUFFS BY MOUTH EVERY 4 TO 6 HOURS AS NEEDED FOR SHORTNESS OF BREATH 8.5 g 1    doxycycline (VIBRAMYCIN) 100 MG Cap  (Patient not taking: Reported on 1/19/2023)       No current facility-administered medications for this visit.     She  has a past medical history of Acne vulgaris (1/22/2019), Migraine headache, Migraine without aura (1/21/2014), Mild intermittent asthma (4/13/2021), and Ulcer.    She has no past medical history of Cancer ([facility]), Diabetes (HCC), Hyperlipidemia, or Hypertension.    ROS  As per listed in the HPI, otherwise negative     Objective:     /86 (Patient Position: Sitting)   Pulse 67   Temp 36.4 °C (97.5 °F) (Temporal)   Resp 16   Ht 1.727 m (5' 8\")   Wt 86.2 kg (190 lb)   SpO2 98%  Body mass index is 28.89 kg/m².     Physical Exam:  General: Patient appears well-nourished, well-hydrated, nontoxic  HEENT, normocephalic atraumatic, PERRLA, extraocular movements " intact, nares are patent and clear  Neck: No visible masses, thyromegaly or abnormalities noted  Cardiovascular.  Sitting comfortably without visible signs of edema  Lungs: No cyanosis noted, nondyspneic  Skin: Well perfused without evidence of rash or lesions  Neurological: Cranial nerves II through XII intact, normal gait  Musculoskeletal: Normal range of motion, normal strength and no deficit noted       Assessment and Plan:   The following treatment plan was discussed and signs and symptoms for which to return were discussed with patient.  Patient verbalized understanding.    1. Need for vaccination  Patient deferred on the flu baljinder    2. Encounter for initial prescription of contraceptive pills  New condition  Urine pregnancy was negative.  Discussed different birth control options.  We will start with the birth control pill but gave referral to gynecology to discuss IUD versus Nexplanon  - POCT Pregnancy  - levonorgestrel-ethinyl estradiol (AVIANE) 0.1-20 MG-MCG per tablet; Take 1 Tablet by mouth every day.  Dispense: 84 Tablet; Refill: 3  - Referral to Gynecology    3. Encounter for contraceptive management, unspecified type  See above  - levonorgestrel-ethinyl estradiol (AVIANE) 0.1-20 MG-MCG per tablet; Take 1 Tablet by mouth every day.  Dispense: 84 Tablet; Refill: 3  - Referral to Gynecology     Of note discussed with patient importance of not smoking and increased risk of venous thromboembolism with birth control and especially if she smokes.  She has no family history of blood clots or personal history    Followup: prn         Please note that this dictation was created using voice recognition software. I have made every reasonable attempt to correct obvious errors, but I expect that there are errors of grammar and possibly content that I did not discover before finalizing the note.

## 2023-03-01 ENCOUNTER — HOSPITAL ENCOUNTER (OUTPATIENT)
Facility: MEDICAL CENTER | Age: 28
End: 2023-03-01
Payer: COMMERCIAL

## 2023-03-01 ENCOUNTER — OFFICE VISIT (OUTPATIENT)
Dept: URGENT CARE | Facility: PHYSICIAN GROUP | Age: 28
End: 2023-03-01
Payer: COMMERCIAL

## 2023-03-01 VITALS
BODY MASS INDEX: 27.28 KG/M2 | SYSTOLIC BLOOD PRESSURE: 126 MMHG | RESPIRATION RATE: 14 BRPM | DIASTOLIC BLOOD PRESSURE: 78 MMHG | HEIGHT: 68 IN | HEART RATE: 73 BPM | WEIGHT: 180 LBS | TEMPERATURE: 97.2 F | OXYGEN SATURATION: 98 %

## 2023-03-01 DIAGNOSIS — N30.01 ACUTE CYSTITIS WITH HEMATURIA: ICD-10-CM

## 2023-03-01 LAB
APPEARANCE UR: NORMAL
BILIRUB UR STRIP-MCNC: NEGATIVE MG/DL
COLOR UR AUTO: NORMAL
GLUCOSE UR STRIP.AUTO-MCNC: NEGATIVE MG/DL
KETONES UR STRIP.AUTO-MCNC: NEGATIVE MG/DL
LEUKOCYTE ESTERASE UR QL STRIP.AUTO: NORMAL
NITRITE UR QL STRIP.AUTO: POSITIVE
PH UR STRIP.AUTO: 6 [PH] (ref 5–8)
PROT UR QL STRIP: NEGATIVE MG/DL
RBC UR QL AUTO: NORMAL
SP GR UR STRIP.AUTO: 1.01
UROBILINOGEN UR STRIP-MCNC: 0.2 MG/DL

## 2023-03-01 PROCEDURE — 81002 URINALYSIS NONAUTO W/O SCOPE: CPT

## 2023-03-01 PROCEDURE — 99213 OFFICE O/P EST LOW 20 MIN: CPT

## 2023-03-01 RX ORDER — NITROFURANTOIN 25; 75 MG/1; MG/1
100 CAPSULE ORAL 2 TIMES DAILY
Qty: 10 CAPSULE | Refills: 0 | Status: SHIPPED | OUTPATIENT
Start: 2023-03-01 | End: 2023-03-06

## 2023-03-01 ASSESSMENT — ENCOUNTER SYMPTOMS
FEVER: 0
CHILLS: 0
BACK PAIN: 0
FLANK PAIN: 0

## 2023-03-02 NOTE — PROGRESS NOTES
Subjective:   Fozia Nichole is a 27 y.o. female who presents for UTI (Freq urination, pain when urinating, urgency, retention. Blood in urine/Onset 2 days)      HPI: This is a 27-year-old female who presents today for UTI symptoms.  Patient reports developing pain with urination, blood in urine, and urinary frequency x2 days.  She reports taking Azo for symptoms.  She reports history of UTIs in the past with similar presentation.  She denies nausea, vomiting, abdominal pain, pelvic pain, low back pain, fevers, chills, body aches.      Review of Systems   Constitutional:  Negative for chills, fever and malaise/fatigue.   Genitourinary:  Positive for dysuria and frequency. Negative for flank pain, hematuria and urgency.   Musculoskeletal:  Negative for back pain.     Medications:    Current Outpatient Medications on File Prior to Visit   Medication Sig Dispense Refill    levonorgestrel-ethinyl estradiol (AVIANE) 0.1-20 MG-MCG per tablet Take 1 Tablet by mouth every day. 84 Tablet 3    doxycycline (VIBRAMYCIN) 100 MG Cap  (Patient not taking: Reported on 1/19/2023)      clindamycin (CLEOCIN T) 1 % Gel Apply topically sparingly twice daily to affected area on the face (Patient not taking: Reported on 3/1/2023) 60 g 1    vitamin D3 (CHOLECALCIFEROL) 1000 Unit (25 mcg) Tab Take 1,000 Units by mouth every day. (Patient not taking: Reported on 3/1/2023)      acetaminophen (TYLENOL) 500 MG Tab Take 500-1,000 mg by mouth every 6 hours as needed. (Patient not taking: Reported on 3/1/2023)      albuterol 108 (90 Base) MCG/ACT Aero Soln inhalation aerosol INHALE ONE TO TWO PUFFS BY MOUTH EVERY 4 TO 6 HOURS AS NEEDED FOR SHORTNESS OF BREATH (Patient not taking: Reported on 3/1/2023) 8.5 g 1     No current facility-administered medications on file prior to visit.        Allergies:   Patient has no known allergies.    Problem List:   Patient Active Problem List   Diagnosis    PTSD (post-traumatic stress disorder)    PCOS  "(polycystic ovarian syndrome)    Elevated testosterone level    Amenorrhea    Mild intermittent asthma    Strain of right hamstring    Lumbar radiculopathy, right        Surgical History:  Past Surgical History:   Procedure Laterality Date    TONSILLECTOMY      TYMPANOPLASTY         Past Social Hx:   Social History     Tobacco Use    Smoking status: Never    Smokeless tobacco: Never   Vaping Use    Vaping Use: Never used   Substance Use Topics    Alcohol use: Yes     Comment: socially     Drug use: Not Currently     Comment: Not currently at this time 7/18/2022.          Problem list, medications, and allergies reviewed by myself today in Epic.     Objective:     /78 (BP Location: Left arm, Patient Position: Sitting, BP Cuff Size: Adult)   Pulse 73   Temp 36.2 °C (97.2 °F) (Temporal)   Resp 14   Ht 1.727 m (5' 8\")   Wt 81.6 kg (180 lb)   SpO2 98%   BMI 27.37 kg/m²     Physical Exam  Vitals and nursing note reviewed.   Constitutional:       General: She is not in acute distress.     Appearance: Normal appearance. She is normal weight. She is not ill-appearing or toxic-appearing.   HENT:      Head: Normocephalic and atraumatic.   Cardiovascular:      Rate and Rhythm: Normal rate and regular rhythm.      Pulses: Normal pulses.      Heart sounds: Normal heart sounds. No murmur heard.    No friction rub. No gallop.   Pulmonary:      Effort: Pulmonary effort is normal. No respiratory distress.      Breath sounds: Normal breath sounds. No stridor. No wheezing, rhonchi or rales.   Chest:      Chest wall: No tenderness.   Abdominal:      Tenderness: There is no right CVA tenderness or left CVA tenderness.   Skin:     General: Skin is warm and dry.      Capillary Refill: Capillary refill takes less than 2 seconds.   Neurological:      General: No focal deficit present.      Mental Status: She is alert and oriented to person, place, and time. Mental status is at baseline.      Gait: Gait normal.   Psychiatric:    "      Mood and Affect: Mood normal.         Behavior: Behavior normal.         Thought Content: Thought content normal.         Judgment: Judgment normal.       Assessment/Plan:     Diagnosis and associated orders:   1. Acute cystitis with hematuria  nitrofurantoin (MACROBID) 100 MG Cap    POCT Urinalysis    URINE CULTURE(NEW)             Comments/MDM:   Pt is clinically stable at today's acute urgent care visit.  No acute distress noted. Appropriate for outpatient management at this time.     Acute problem patient presents today for UTI symptoms.  POC UA positive for blood, leukocytes, and nitrites.  Patient has taken AZO.  Urine will be sent for culture, will follow.  Patient be treated empirically with nitrofurantoin 100 mg twice daily x5 days.  Advised patient begin taking medication as prescribed and increase oral hydration. Patient is to return to UC or go to ER for any new or worsening signs or symptoms, and follow with with PCP for recheck. Patient is agreeable with plan of care and verbalizes good understanding.             Discussed DDx, management options (risks,benefits, and alternatives to planned treatment), natural progression and supportive care.  Expressed understanding and the treatment plan was agreed upon. Questions were encouraged and answered   Return to urgent care prn if new or worsening sx or if there is no improvement in condition prn.    Educated in Red flags and indications to immediately call 911 or present to the Emergency Department.   Advised the patient to follow-up with the primary care physician for recheck, reevaluation, and consideration of further management.    I personally reviewed prior external notes and test results pertinent to today's visit.  I have independently reviewed and interpreted all diagnostics ordered during this urgent care acute visit.     Please note that this dictation was created using voice recognition software. I have made a reasonable attempt to correct  obvious errors, but I expect that there are errors of grammar and possibly content that I did not discover before finalizing the note.    This note was electronically signed by BRANDI Mccord

## 2023-08-25 ENCOUNTER — TELEPHONE (OUTPATIENT)
Dept: OBGYN | Facility: CLINIC | Age: 28
End: 2023-08-25

## 2023-12-08 ENCOUNTER — GYNECOLOGY VISIT (OUTPATIENT)
Dept: OBGYN | Facility: CLINIC | Age: 28
End: 2023-12-08
Payer: COMMERCIAL

## 2023-12-08 ENCOUNTER — HOSPITAL ENCOUNTER (OUTPATIENT)
Facility: MEDICAL CENTER | Age: 28
End: 2023-12-08
Attending: PHYSICIAN ASSISTANT
Payer: COMMERCIAL

## 2023-12-08 VITALS
HEIGHT: 68 IN | WEIGHT: 220.8 LBS | SYSTOLIC BLOOD PRESSURE: 132 MMHG | DIASTOLIC BLOOD PRESSURE: 87 MMHG | BODY MASS INDEX: 33.46 KG/M2

## 2023-12-08 DIAGNOSIS — R10.2 PELVIC PAIN: ICD-10-CM

## 2023-12-08 DIAGNOSIS — E28.2 PCOS (POLYCYSTIC OVARIAN SYNDROME): ICD-10-CM

## 2023-12-08 DIAGNOSIS — Z12.4 SCREENING FOR CERVICAL CANCER: ICD-10-CM

## 2023-12-08 DIAGNOSIS — Z01.419 WELL WOMAN EXAM: ICD-10-CM

## 2023-12-08 PROCEDURE — 3075F SYST BP GE 130 - 139MM HG: CPT | Performed by: PHYSICIAN ASSISTANT

## 2023-12-08 PROCEDURE — 99395 PREV VISIT EST AGE 18-39: CPT | Performed by: PHYSICIAN ASSISTANT

## 2023-12-08 PROCEDURE — 3079F DIAST BP 80-89 MM HG: CPT | Performed by: PHYSICIAN ASSISTANT

## 2023-12-08 PROCEDURE — 87491 CHLMYD TRACH DNA AMP PROBE: CPT

## 2023-12-08 PROCEDURE — 88175 CYTOPATH C/V AUTO FLUID REDO: CPT

## 2023-12-08 PROCEDURE — 87591 N.GONORRHOEAE DNA AMP PROB: CPT

## 2023-12-08 NOTE — PROGRESS NOTES
ANNUAL GYNECOLOGY VISIT    Chief Complaint  Annual    Subjective  Fozia Nichole is a 28 y.o. female No obstetric history on file. Patient's last menstrual period was 11/23/2023 (exact date) using OCPs for contraception who presents today for Annual Exam.      HX of PCOS. Cycles regular with OCPS- previously would go years between cycles. Has been on OCPS x 1 year. Cycles are very painful with cramping few days before and first few days of cycle. Uses heating pad and NSAIDs sparingly due to hx of ulcers. Since starting OCPs reports intermittent random R>L pelvic pain 'on ovaries'. Pain not a/w menses. Did have severe pain after intercourse once that self resolved.     Preventive Care   Immunization History   Administered Date(s) Administered    DTP - Historical vaccine 1994, 1994, 1994    Dtap Vaccine 09/07/1995, 06/03/1998    Hepatitis A Vaccine, Ped/Adol 07/15/2004, 06/01/2006    Hepatitis B Vaccine Adolescent/Pediatric 1994, 1994, 1994    Hib Vaccine (Prp-d) - HISTORICAL DATA 1994, 1994, 1994, 09/07/1995    Influenza Vac Subunit Quad Inj (Pf) 10/04/2020    MMR Vaccine 09/07/1995, 06/03/1998    MODERNA SARS-COV-2 VACCINE (12+) 07/19/2021, 08/16/2021    Meningococcal Conjugate Vaccine MCV4 (Menactra) 06/01/2006    OPV TRIVALENT - HISTORICAL DATA (GIVEN PRIOR TO MAY 2016) 1994, 1994, 1994, 06/03/1998    Tdap Vaccine 06/01/2006    Varicella Vaccine Live 08/14/2008, 11/20/2008       Guardasil HPV vaccine: due    Gynecology History and ROS  Current Sexual Activity: yes - monogamous male partner   History of sexually transmitted diseases? no  Abnormal discharge? no  Current Contraception:  OCP    Menstrual History  Patient's last menstrual period was 11/23/2023 (exact date).  Periods are regular  q 28 days, lasting 3-4 days.   Clots or heavy flow: no  Dysmenorrhea: yes - see HPI  Intermenstrual bleeding/spotting: no  Significant pain with  periods:no  Bothersome PMS symptoms: yes - mood swings   Significant Pelvic Pain: yes -  see HPI    Pap History  Last pap smear: 2018 [facility], 2 years ago at [facility] per pt  History of moderate or severe dysplasia: no    Cancer Risk Assessement:  Family history of:   - Breast cancer: Mother in 40s    - Ovarian cancer: MGM   - Uterine cancer: no   - Colon cancer: no  Unknown if BRCA testing was done, will find out     Obstetric History  OB History    Para Term  AB Living   0 0 0 0 0 0   SAB IAB Ectopic Molar Multiple Live Births   0 0 0 0 0 0       Past Medical History  Past Medical History:   Diagnosis Date    Acne vulgaris 2019    Migraine headache     Migraine without aura 2014     ICD-10 transition    Mild intermittent asthma 2021    Ulcer        Past Surgical History  Past Surgical History:   Procedure Laterality Date    TONSILLECTOMY      TYMPANOPLASTY         Social History  Social History     Tobacco Use    Smoking status: Never    Smokeless tobacco: Never   Vaping Use    Vaping Use: Some days    Substances: Nicotine    Devices: Disposable   Substance Use Topics    Alcohol use: Yes     Comment: socially     Drug use: Not Currently     Comment: Not currently at this time 2022.        Family History  Family History   Problem Relation Age of Onset    Breast Cancer Mother 52    Cancer Maternal Grandmother         Ovarian CA     Heart Disease Maternal Grandfather     Thyroid Sister         Hyperthyroid     Stroke Neg Hx        Home Medications  Current Outpatient Medications   Medication Sig    levonorgestrel-ethinyl estradiol (AVIANE) 0.1-20 MG-MCG per tablet Take 1 Tablet by mouth every day.    vitamin D3 (CHOLECALCIFEROL) 1000 Unit (25 mcg) Tab Take 1,000 Units by mouth every day. (Patient not taking: Reported on 3/1/2023)       Allergies/Reactions  No Known Allergies    ROS  Positive ROS: see HPI  Gen: no fevers or chills, no significant weight loss or gain, excessive  "fatigue  Respiratory:  no cough or dyspnea  Cardiac:  no chest pain, no palpitations, no syncope  Breast: no breast discharge, pain, lump or skin changes  GI:  no heartburn, no abdominal pain, no nausea or vomiting  Urinary: no dysuria, urgency, frequency, incontinence   Psych: no depression or anxiety  Neuro: no migraines with aura, fainting spells, numbness or tingling  Extremities: no joint pain, persistently swollen ankles, recurrent leg cramps      Physical Examination:  Vital Signs: /87   Ht 5' 8\"   Wt 220 lb 12.8 oz   LMP 11/23/2023 (Exact Date)   BMI 33.57 kg/m²       Constitutional: The patient is well developed and well nourished.  Psychiatric: Patient is oriented to time place and person.   Skin: No rash observed.  Neck: Appears symmetric. Thyroid normal size  Respiratory: normal effort  Breast: Inspection reveals no asymetry or nipple discharge, no skin thickening, dimpling or erythema.  Palpation demonstrates no masses.  Abdomen: Soft, non-tender.  Pelvic Exam:      Vulva: external female genitalia are normal in appearance. No lesions     Urethra - no lesions, no erythema     Vagina: moist, pink, normal ruggae     Cervix: pink, smooth, no lesions, no CMT     Uterus - non-tender, normal size, shape, contour, mobile, anteverted     Ovaries: non-tender, no appreciable masses    Pap Smear performed: Yes    Chaperone Present: ROBINA Gomes  Extremeties: Legs are symmetric and without tenderness. There is no edema present.        Assessment & Plan  Fozia Nichole is a 28 y.o. female who presents today for Annual Gyn Exam.     1. Well woman exam    -Anticipatory guidance given. Encouraged adequate water intake, healthy diet, regular exercise. Educated on Pap smear screening and guidelines for age per ACOG and ASSCP. Discussed safe sex, STI prevention, contraception/family planning. Self breast exam taught.     2. Screening for cervical cancer    - THINPREP RFLX HPV ASCUS W/CTNG    3. PCOS/pelvic " pain    - Discussed alternative forms of contraception. Recommend Mirena IUD to decrease cycles and therefore symptoms a/w menses in addition to decreasing risk of endometrial hyperplasia due ot oligomenorrhea from PCOS. US to r/o other etiologies of 'ovary pain'.   - Discussed PCOS and association with infertility and metabolic disorders such as diabetes and hyperlipidemia.   - US-PELVIC COMPLETE (TRANSABDOMINAL/TRANSVAGINAL) (COMBO); Future            Return: 3-4 weeks for IUD placement     Veronika Ward P.A.-C.

## 2023-12-12 LAB
C TRACH RRNA CVX QL NAA+PROBE: NEGATIVE
COMMENT NL11729A: NORMAL
CYTOLOGIST CVX/VAG CYTO: NORMAL
CYTOLOGY CVX/VAG DOC CYTO: NORMAL
CYTOLOGY CVX/VAG DOC THIN PREP: NORMAL
N GONORRHOEA RRNA CVX QL NAA+PROBE: NEGATIVE
NOTE NL11727A: NORMAL
OTHER STN SPEC: NORMAL
STAT OF ADQ CVX/VAG CYTO-IMP: NORMAL

## 2023-12-21 DIAGNOSIS — Z30.9 ENCOUNTER FOR CONTRACEPTIVE MANAGEMENT, UNSPECIFIED TYPE: ICD-10-CM

## 2023-12-21 DIAGNOSIS — Z30.011 ENCOUNTER FOR INITIAL PRESCRIPTION OF CONTRACEPTIVE PILLS: ICD-10-CM

## 2023-12-22 RX ORDER — TIMOLOL MALEATE 5 MG/ML
1 SOLUTION/ DROPS OPHTHALMIC
Qty: 84 TABLET | Refills: 0 | Status: SHIPPED | OUTPATIENT
Start: 2023-12-22 | End: 2024-03-14

## 2024-01-03 ENCOUNTER — OFFICE VISIT (OUTPATIENT)
Dept: URGENT CARE | Facility: CLINIC | Age: 29
End: 2024-01-03
Payer: COMMERCIAL

## 2024-01-03 VITALS
HEART RATE: 88 BPM | TEMPERATURE: 97.6 F | DIASTOLIC BLOOD PRESSURE: 88 MMHG | OXYGEN SATURATION: 97 % | RESPIRATION RATE: 16 BRPM | HEIGHT: 68 IN | SYSTOLIC BLOOD PRESSURE: 124 MMHG | WEIGHT: 190 LBS | BODY MASS INDEX: 28.79 KG/M2

## 2024-01-03 DIAGNOSIS — J06.9 VIRAL URI: ICD-10-CM

## 2024-01-03 DIAGNOSIS — J02.9 PHARYNGITIS, UNSPECIFIED ETIOLOGY: ICD-10-CM

## 2024-01-03 LAB — S PYO DNA SPEC NAA+PROBE: NOT DETECTED

## 2024-01-03 PROCEDURE — 3079F DIAST BP 80-89 MM HG: CPT | Performed by: NURSE PRACTITIONER

## 2024-01-03 PROCEDURE — 87651 STREP A DNA AMP PROBE: CPT | Performed by: NURSE PRACTITIONER

## 2024-01-03 PROCEDURE — 3074F SYST BP LT 130 MM HG: CPT | Performed by: NURSE PRACTITIONER

## 2024-01-03 PROCEDURE — 99213 OFFICE O/P EST LOW 20 MIN: CPT | Performed by: NURSE PRACTITIONER

## 2024-01-03 NOTE — PROGRESS NOTES
"Date: 01/03/24     Chief Complaint:    Chief Complaint   Patient presents with    Nasal Congestion     X1 day, \" Sore throat , left lymph node is swollen, headache.\"         History of Present Illness: 28 y.o. female  presents to clinic with 1 day history of sore throat left-sided lymphadenopathy headache rhinorrhea and congestion.  She has had some body aches no fevers.  Mild cough due to postnasal drip.  No known sick contacts.  No shortness of breath chest pain or leg swelling.  Patient does have a history of asthma she denies any shortness of breath or wheezing.    ROS:  As stated in HPI       Medical/SX/ Social History:  Reviewed per chart    Medications:    Current Outpatient Medications on File Prior to Visit   Medication Sig Dispense Refill    levonorgestrel-ethinyl estradiol (VIENVA) 0.1-20 MG-MCG per tablet TAKE 1 TABLET BY MOUTH EVERY DAY 84 Tablet 0    vitamin D3 (CHOLECALCIFEROL) 1000 Unit (25 mcg) Tab Take 1,000 Units by mouth every day. (Patient not taking: Reported on 3/1/2023)       No current facility-administered medications on file prior to visit.        Allergies:    Patient has no known allergies.     Problem list, medications, and allergies reviewed by myself today in Epic       Physical Exam:  Vitals:    01/03/24 1500   BP: 124/88   Pulse: 88   Resp: 16   Temp: 36.4 °C (97.6 °F)   SpO2: 97%        Physical Exam  Vitals reviewed.   Constitutional:       General: She is not in acute distress.     Appearance: Normal appearance. She is well-developed. She is not toxic-appearing.   HENT:      Head: Normocephalic and atraumatic.      Right Ear: Tympanic membrane, ear canal and external ear normal.      Left Ear: Tympanic membrane, ear canal and external ear normal.      Nose: Congestion and rhinorrhea present.      Mouth/Throat:      Lips: Pink.      Mouth: Mucous membranes are moist.      Pharynx: Posterior oropharyngeal erythema present.   Eyes:      General: Lids are normal. Gaze aligned " appropriately. No allergic shiner or scleral icterus.     Extraocular Movements: Extraocular movements intact.      Conjunctiva/sclera: Conjunctivae normal.      Pupils: Pupils are equal, round, and reactive to light.   Cardiovascular:      Rate and Rhythm: Normal rate and regular rhythm.      Pulses:           Radial pulses are 2+ on the right side and 2+ on the left side.      Heart sounds: Normal heart sounds.   Pulmonary:      Effort: Pulmonary effort is normal.      Breath sounds: Normal breath sounds. No wheezing.   Musculoskeletal:      Right lower leg: No edema.      Left lower leg: No edema.   Lymphadenopathy:      Cervical: Cervical adenopathy present.      Right cervical: No superficial cervical adenopathy.     Left cervical: Superficial cervical adenopathy present.   Skin:     General: Skin is warm.      Capillary Refill: Capillary refill takes less than 2 seconds.      Coloration: Skin is not cyanotic or pale.      Findings: No rash.   Neurological:      Mental Status: She is alert.      Gait: Gait is intact.   Psychiatric:         Behavior: Behavior normal. Behavior is cooperative.          Diagnostics:      Recent Results (from the past 24 hour(s))   POCT CEPHEID GROUP A STREP - PCR    Collection Time: 01/03/24  3:06 PM   Result Value Ref Range    POC Group A Strep, PCR Not Detected Not Detected, Invalid         Diagnostics interpreted by myself.      Medical Decision Making / Plan :  I personally reviewed prior external notes and test results pertinent to today's visit. Pt is clinically stable at today's acute urgent care visit.  Patient appears nontoxic with no acute distress noted. Appropriate for outpatient care at this time. The patient remained stable during the urgent care visit.     Pleasant 28 y.o. female  presented clinic with HPI exam findings consistent with viral URI.  Although due to significant sore throat did obtain strep testing which was fortunately negative.  Did discuss obtaining  viral testing although using shared decision making will defer at this time.  Did discuss due to her previous history of asthma she may be at risk for severe flu or COVID although patient states previous infections were not severe and she did tolerate well.  Shared decision-making was utilized with patient for treatment plan. Differential Diagnosis, natural history, and supportive care discussed. Did advise patient on conservative measures for management of symptoms.  Patient is agreeable to pursue adequate rest, adequate hydration, saltwater gargle and Neti pot for any symptoms of upper respiratory congestion.  Over-the-counter analgesia and antipyretics on a p.r.n. basis as needed for pain and fever.  Did discuss over-the-counter cough medications.      1. Viral URI      2. Pharyngitis, unspecified etiology    - POCT CEPHEID GROUP A STREP - PCR     Medication discussed included indication for use and the potential benefits and side effects. Education was provided regarding the aforementioned assessments.  All of the patient's questions were answered to their satisfaction at the time of discharge. Patient was encouraged to monitor symptoms closely. Those signs and symptoms which would warrant concern and mandate seeking a higher level of service through the emergency department discussed at length.  Patient stated agreement and understanding of this plan of care.        Disposition:  Patient was discharged in stable condition.    Voice Recognition Disclaimer: Portions of this document were created using voice recognition software. The software does have a chance of producing errors of grammar and possibly content. I have made every reasonable attempt to correct obvious errors, but there may be errors of grammar and possibly content that I did not discover before finalizing the documentation.    Jeannine Vaughn, A.P.R.N.

## 2024-01-03 NOTE — LETTER
January 3, 2024    To Whom It May Concern:         This is confirmation that Fozia Nichole attended her scheduled appointment with RUY Steiner on 1/03/24. Please excuse from work 1/3/24 through 1/4/24          Sincerely,          BLANCA Steiner.  [phone]

## 2024-01-07 ENCOUNTER — APPOINTMENT (OUTPATIENT)
Dept: RADIOLOGY | Facility: MEDICAL CENTER | Age: 29
End: 2024-01-07
Attending: PHYSICIAN ASSISTANT
Payer: COMMERCIAL

## 2024-01-09 ENCOUNTER — APPOINTMENT (OUTPATIENT)
Dept: OBGYN | Facility: CLINIC | Age: 29
End: 2024-01-09
Payer: COMMERCIAL

## 2024-01-28 ENCOUNTER — HOSPITAL ENCOUNTER (OUTPATIENT)
Dept: RADIOLOGY | Facility: MEDICAL CENTER | Age: 29
End: 2024-01-28
Attending: PHYSICIAN ASSISTANT
Payer: COMMERCIAL

## 2024-01-28 DIAGNOSIS — R10.2 PELVIC PAIN: ICD-10-CM

## 2024-01-28 PROCEDURE — 76830 TRANSVAGINAL US NON-OB: CPT

## 2024-03-14 DIAGNOSIS — Z30.011 ENCOUNTER FOR INITIAL PRESCRIPTION OF CONTRACEPTIVE PILLS: ICD-10-CM

## 2024-03-14 DIAGNOSIS — Z30.9 ENCOUNTER FOR CONTRACEPTIVE MANAGEMENT, UNSPECIFIED TYPE: ICD-10-CM

## 2024-03-14 RX ORDER — TIMOLOL MALEATE 5 MG/ML
1 SOLUTION/ DROPS OPHTHALMIC
Qty: 84 TABLET | Refills: 0 | Status: SHIPPED | OUTPATIENT
Start: 2024-03-14

## 2024-03-14 NOTE — TELEPHONE ENCOUNTER
Received request via: Pharmacy    Was the patient seen in the last year in this department? Yes    Does the patient have an active prescription (recently filled or refills available) for medication(s) requested? No    [facility]    Does the patient have senior living Plus and need 100 day supply (blood pressure, diabetes and cholesterol meds only)? Patient does not have SCP

## 2024-06-18 ENCOUNTER — OFFICE VISIT (OUTPATIENT)
Dept: MEDICAL GROUP | Facility: MEDICAL CENTER | Age: 29
End: 2024-06-18
Payer: COMMERCIAL

## 2024-06-18 VITALS
OXYGEN SATURATION: 98 % | BODY MASS INDEX: 29.1 KG/M2 | SYSTOLIC BLOOD PRESSURE: 132 MMHG | WEIGHT: 192 LBS | TEMPERATURE: 97 F | HEIGHT: 68 IN | DIASTOLIC BLOOD PRESSURE: 82 MMHG | HEART RATE: 78 BPM

## 2024-06-18 DIAGNOSIS — Z30.011 ENCOUNTER FOR INITIAL PRESCRIPTION OF CONTRACEPTIVE PILLS: ICD-10-CM

## 2024-06-18 DIAGNOSIS — Z30.9 ENCOUNTER FOR CONTRACEPTIVE MANAGEMENT, UNSPECIFIED TYPE: ICD-10-CM

## 2024-06-18 DIAGNOSIS — E28.2 PCOS (POLYCYSTIC OVARIAN SYNDROME): ICD-10-CM

## 2024-06-18 LAB
POCT INT CON NEG: NEGATIVE
POCT INT CON POS: POSITIVE
POCT URINE PREGNANCY TEST: NEGATIVE

## 2024-06-18 PROCEDURE — 99214 OFFICE O/P EST MOD 30 MIN: CPT | Performed by: PHYSICIAN ASSISTANT

## 2024-06-18 PROCEDURE — 81025 URINE PREGNANCY TEST: CPT | Performed by: PHYSICIAN ASSISTANT

## 2024-06-18 PROCEDURE — 3079F DIAST BP 80-89 MM HG: CPT | Performed by: PHYSICIAN ASSISTANT

## 2024-06-18 PROCEDURE — 3075F SYST BP GE 130 - 139MM HG: CPT | Performed by: PHYSICIAN ASSISTANT

## 2024-06-18 RX ORDER — LEVONORGESTREL/ETHIN.ESTRADIOL 0.1-0.02MG
1 TABLET ORAL
Qty: 84 TABLET | Refills: 3 | Status: SHIPPED | OUTPATIENT
Start: 2024-06-18

## 2024-06-18 NOTE — PROGRESS NOTES
"Subjective:     History of Present Illness  The patient presents for birth control refill.    The patient's last consultation was on 01/19/2023, during which she was prescribed birth control. However, due to an insurance denial from her pharmacy, she has been unable to refill her birth control for the past week. She reports experiencing severe headaches, cramping, and pain, which she attributes to the discontinuation of her birth control. Her menstrual cycle has ceased. Her birth control regimen is primarily for contraception and management of Polycystic Ovary Syndrome (PCOS). Currently, she is sexually active.      Current medicines (including changes today)  Current Outpatient Medications   Medication Sig Dispense Refill    levonorgestrel-ethinyl estradiol (VIENVA) 0.1-20 MG-MCG per tablet Take 1 Tablet by mouth every day. 84 Tablet 3    vitamin D3 (CHOLECALCIFEROL) 1000 Unit (25 mcg) Tab Take 1,000 Units by mouth every day. (Patient not taking: Reported on 3/1/2023)       No current facility-administered medications for this visit.     She  has a past medical history of Acne vulgaris (1/22/2019), Migraine headache, Migraine without aura (1/21/2014), Mild intermittent asthma (4/13/2021), and Ulcer.    She has no past medical history of Cancer ([facility]), Diabetes ([facility]), Hyperlipidemia, or Hypertension.    ROS   No chest pain, no shortness of breath, no abdominal pain  Positive ROS as per HPI.  All other systems reviewed and are negative.     Objective:     /82   Pulse 78   Temp 36.1 °C (97 °F) (Temporal)   Ht 1.727 m (5' 8\")   Wt 87.1 kg (192 lb)   SpO2 98%  Body mass index is 29.19 kg/m².   Physical Exam    Constitutional: Alert, no distress.  Skin: Warm, dry, good turgor, no rashes in visible areas.  Eye: Equal, round and reactive, conjunctiva clear, lids normal.  ENMT: Lips without lesions, good dentition, oropharynx clear.  Neck: Trachea midline, no masses, no thyromegaly. No cervical or supraclavicular " lymphadenopathy  Respiratory: Unlabored respiratory effort, lungs clear to auscultation, no wheezes, no ronchi.  Cardiovascular: Normal S1, S2, no murmur, no edema.  Abdomen: Soft, non-tender, no masses, no hepatosplenomegaly.  Psych: Alert and oriented x3, normal affect and mood.      Results          Assessment and Plan:   The following treatment plan was discussed    Assessment & Plan  1. Contraception management and history of Polycystic Ovary Syndrome.  The patient is currently sexually active and has exhausted her supply of birth control pills. It has been approximately 1.5 years since her last consultation. A year's supply of her birth control pills was refilled, following a negative urine pregnancy test. It was emphasized that annual follow-ups for prescriptions are necessary. It was also advised that she abstains from smoking while on birth control, as this could increase the risk of blood clots.      ORDERS:  1. Encounter for initial prescription of contraceptive pills      2. PCOS (polycystic ovarian syndrome)    - levonorgestrel-ethinyl estradiol (VIENVA) 0.1-20 MG-MCG per tablet; Take 1 Tablet by mouth every day.  Dispense: 84 Tablet; Refill: 3    3. Encounter for contraceptive management, unspecified type    - POCT Pregnancy  - levonorgestrel-ethinyl estradiol (VIENVA) 0.1-20 MG-MCG per tablet; Take 1 Tablet by mouth every day.  Dispense: 84 Tablet; Refill: 3      Anticipatory guidance discussed at length including regular daily exercise with a goal of 150 minutes a week.  Recommendation to eat the Mediterranean diet to decrease cardiovascular risk.  Encouraged avoiding high fructose corn syrup and other simple sugars to reduce risk of obesity and type 2 diabetes.    Follow Up: 1 year    Please note that this dictation was created using voice recognition software. I have made every reasonable attempt to correct obvious errors, but I expect that there are errors of grammar and possibly content that I  did not discover before finalizing the note.      Attestation      Verbal consent was acquired by the patient to use [employer] ambient listening note generation during this visit Yes

## 2024-08-06 ENCOUNTER — EH NON-PROVIDER (OUTPATIENT)
Dept: OCCUPATIONAL MEDICINE | Facility: CLINIC | Age: 29
End: 2024-08-06

## 2024-08-06 ENCOUNTER — HOSPITAL ENCOUNTER (OUTPATIENT)
Facility: MEDICAL CENTER | Age: 29
End: 2024-08-06
Attending: PREVENTIVE MEDICINE
Payer: COMMERCIAL

## 2024-08-06 DIAGNOSIS — Z02.1 PRE-EMPLOYMENT DRUG SCREENING: ICD-10-CM

## 2024-08-06 DIAGNOSIS — Z02.89 ENCOUNTER FOR OCCUPATIONAL HEALTH EXAMINATION: ICD-10-CM

## 2024-08-06 DIAGNOSIS — Z02.89 ENCOUNTER FOR OCCUPATIONAL HEALTH EXAMINATION: Primary | ICD-10-CM

## 2024-08-06 LAB
AMP AMPHETAMINE: NORMAL
BAR BARBITURATES: NORMAL
BZO BENZODIAZEPINES: NORMAL
COC COCAINE: NORMAL
INT CON NEG: NORMAL
INT CON POS: NORMAL
MDMA ECSTASY: NORMAL
MET METHAMPHETAMINES: NORMAL
MTD METHADONE: NORMAL
OPI OPIATES: NORMAL
OXY OXYCODONE: NORMAL
PCP PHENCYCLIDINE: NORMAL
POC URINE DRUG SCREEN OCDRS: NORMAL
THC: NORMAL

## 2024-08-06 PROCEDURE — 90715 TDAP VACCINE 7 YRS/> IM: CPT | Performed by: PREVENTIVE MEDICINE

## 2024-08-06 PROCEDURE — 80305 DRUG TEST PRSMV DIR OPT OBS: CPT | Performed by: PREVENTIVE MEDICINE

## 2024-08-06 PROCEDURE — 90471 IMMUNIZATION ADMIN: CPT | Performed by: PREVENTIVE MEDICINE

## 2024-08-06 PROCEDURE — 86480 TB TEST CELL IMMUN MEASURE: CPT | Performed by: PREVENTIVE MEDICINE

## 2024-08-08 LAB
GAMMA INTERFERON BACKGROUND BLD IA-ACNC: 0.03 IU/ML
M TB IFN-G BLD-IMP: NEGATIVE
M TB IFN-G CD4+ BCKGRND COR BLD-ACNC: 0 IU/ML
MITOGEN IGNF BCKGRD COR BLD-ACNC: >10 IU/ML
QFT TB2 - NIL TBQ2: 0 IU/ML

## 2024-12-26 ENCOUNTER — APPOINTMENT (OUTPATIENT)
Dept: URGENT CARE | Facility: CLINIC | Age: 29
End: 2024-12-26

## 2024-12-26 ENCOUNTER — NON-PROVIDER VISIT (OUTPATIENT)
Dept: URGENT CARE | Facility: CLINIC | Age: 29
End: 2024-12-26

## 2024-12-26 DIAGNOSIS — Z23 FLU VACCINE NEED: ICD-10-CM

## 2024-12-26 PROCEDURE — 90656 IIV3 VACC NO PRSV 0.5 ML IM: CPT | Performed by: PHYSICIAN ASSISTANT

## 2024-12-27 NOTE — PROGRESS NOTES
"Fozia Nichole is a 29 y.o. female here for a non-provider visit for:   FLU    Reason for immunization: needed for work/school  Immunization records indicate need for vaccine: No  Minimum interval has been met for this vaccine: Yes  ABN completed: Not Indicated    VIS Dated  12/26/2024 was given to patient: No  All IAC Questionnaire questions were answered \"No.\"    Patient tolerated injection and no adverse effects were observed or reported: Yes    Pt scheduled for next dose in series: No  "

## 2024-12-27 NOTE — PROGRESS NOTES
Fozia Nichole is a 29 y.o. female here for a non-provider visit for:   {VISIT IZ:88121}    Reason for immunization: Annual Flu Vaccine  Immunization records indicate need for vaccine: No  Minimum interval has been met for this vaccine: Yes  ABN completed: No    VIS Dated  12/26/20245 was given to patient: Yes      Patient tolerated injection and no adverse effects were observed or reported: Yes    Pt scheduled for next dose in series: No

## 2025-03-26 ENCOUNTER — OFFICE VISIT (OUTPATIENT)
Dept: URGENT CARE | Facility: CLINIC | Age: 30
End: 2025-03-26
Payer: COMMERCIAL

## 2025-03-26 VITALS
DIASTOLIC BLOOD PRESSURE: 60 MMHG | HEART RATE: 80 BPM | SYSTOLIC BLOOD PRESSURE: 120 MMHG | OXYGEN SATURATION: 96 % | HEIGHT: 68 IN | TEMPERATURE: 98 F | WEIGHT: 192 LBS | RESPIRATION RATE: 18 BRPM | BODY MASS INDEX: 29.1 KG/M2

## 2025-03-26 DIAGNOSIS — J45.21 MILD INTERMITTENT ASTHMA WITH ACUTE EXACERBATION: ICD-10-CM

## 2025-03-26 DIAGNOSIS — J30.1 SEASONAL ALLERGIC RHINITIS DUE TO POLLEN: ICD-10-CM

## 2025-03-26 PROCEDURE — 3074F SYST BP LT 130 MM HG: CPT | Performed by: NURSE PRACTITIONER

## 2025-03-26 PROCEDURE — 99214 OFFICE O/P EST MOD 30 MIN: CPT | Performed by: NURSE PRACTITIONER

## 2025-03-26 PROCEDURE — 3078F DIAST BP <80 MM HG: CPT | Performed by: NURSE PRACTITIONER

## 2025-03-26 RX ORDER — PREDNISONE 20 MG/1
40 TABLET ORAL DAILY
Qty: 10 TABLET | Refills: 0 | Status: SHIPPED | OUTPATIENT
Start: 2025-03-26 | End: 2025-03-31

## 2025-03-26 RX ORDER — ALBUTEROL SULFATE 90 UG/1
2 INHALANT RESPIRATORY (INHALATION) EVERY 6 HOURS PRN
Qty: 8.5 G | Refills: 1 | Status: SHIPPED | OUTPATIENT
Start: 2025-03-26

## 2025-03-26 NOTE — PROGRESS NOTES
Date: 03/26/25        Chief Complaint   Patient presents with    Other     Runny nose watery eyes    Shortness of Breath    Pharyngitis        History of Present Illness: 29 y.o.  female presents to clinic with runny nose watery eyes and itchy throat.  Patient reports she has a history of asthma and has been having some shortness of breath and restriction.  Unfortunately she has run out of her albuterol inhaler.  She denies any fevers or bodyaches.  No nausea vomiting diarrhea.  She plans to start a daily allergy medication.  She reports symptoms are consistent with previous seasonal allergies as well as asthma.      ROS:    No severe shortness of breath   No Cardiac like chest pain, as discussed   As otherwise stated in HPI    Medical/SX/ Social History:  Reviewed per chart    Pertinent Medications:    Current Outpatient Medications on File Prior to Visit   Medication Sig Dispense Refill    levonorgestrel-ethinyl estradiol (VIENVA) 0.1-20 MG-MCG per tablet Take 1 Tablet by mouth every day. 84 Tablet 3    vitamin D3 (CHOLECALCIFEROL) 1000 Unit (25 mcg) Tab Take 1,000 Units by mouth every day. (Patient not taking: Reported on 3/26/2025)       No current facility-administered medications on file prior to visit.        Allergies:    Patient has no known allergies.     Problem list, medications, and allergies reviewed by myself today in Epic     Physical Exam:    Vitals:    03/26/25 1557   BP: 120/60   Pulse: 80   Resp: 18   Temp: 36.7 °C (98 °F)   SpO2: 96%             Physical Exam  Vitals reviewed.   Constitutional:       General: She is not in acute distress.     Appearance: Normal appearance. She is well-developed. She is not toxic-appearing.   HENT:      Head: Normocephalic and atraumatic.      Right Ear: Tympanic membrane, ear canal and external ear normal.      Left Ear: Tympanic membrane, ear canal and external ear normal.      Nose: Rhinorrhea present. Rhinorrhea is clear.      Mouth/Throat:      Lips: Pink.       Mouth: Mucous membranes are moist.      Pharynx: Posterior oropharyngeal erythema present.   Eyes:      General: Lids are normal. Gaze aligned appropriately. No allergic shiner or scleral icterus.     Extraocular Movements: Extraocular movements intact.      Conjunctiva/sclera: Conjunctivae normal.      Pupils: Pupils are equal, round, and reactive to light.   Cardiovascular:      Rate and Rhythm: Normal rate and regular rhythm.      Pulses:           Radial pulses are 2+ on the right side and 2+ on the left side.      Heart sounds: Normal heart sounds.   Pulmonary:      Effort: Pulmonary effort is normal.      Breath sounds: Normal breath sounds. No wheezing.   Musculoskeletal:      Right lower leg: No edema.      Left lower leg: No edema.   Lymphadenopathy:      Cervical: No cervical adenopathy.   Skin:     General: Skin is warm.      Capillary Refill: Capillary refill takes less than 2 seconds.      Coloration: Skin is not cyanotic or pale.      Findings: No rash.   Neurological:      Mental Status: She is alert.      Gait: Gait is intact.   Psychiatric:         Behavior: Behavior normal. Behavior is cooperative.                Medical Decision making and plan :  I personally reviewed prior external notes and test results pertinent to today's visit. Pt is clinically stable at today's acute urgent care visit.  Patient appears nontoxic with no acute distress noted. Appropriate for outpatient care at this time.      Pleasant 29 y.o. female presented clinic with HPI exam findings consistent with mild intermittent asthma with acute exacerbation.  Patient has not had an albuterol inhaler available to her did send refill of albuterol inhaler.  Advised to use 1 to 2 days for improvement.  If there is no improvement then she may start the prednisone.  Exam and is consistent with allergic rhinitis recommend daily Zyrtec.    1. Mild intermittent asthma with acute exacerbation    - albuterol 108 (90 Base) MCG/ACT Aero  Soln inhalation aerosol; Inhale 2 Puffs every 6 hours as needed for Shortness of Breath.  Dispense: 8.5 g; Refill: 1  - predniSONE (DELTASONE) 20 MG Tab; Take 2 Tablets by mouth every day for 5 days.  Dispense: 10 Tablet; Refill: 0    2. Seasonal allergic rhinitis due to pollen               Shared decision-making was utilized with patient for treatment plan. Medication discussed included indication for use and the potential benefits and side effects. Education was provided regarding the aforementioned assessments.  Differential Diagnosis, natural history, and supportive care discussed. All of the patient's questions were answered to their satisfaction at the time of discharge. Patient was encouraged to monitor symptoms closely. Those signs and symptoms which would warrant concern and mandate seeking a higher level of service through the emergency department discussed at length.  Patient stated agreement and understanding of this plan of care.    Disposition:  Home in stable condition       Voice Recognition Disclaimer:  Portions of this document were created using voice recognition software. The software does have a chance of producing errors of grammar and possibly content. I have made every reasonable attempt to correct obvious errors, but there may be errors of grammar and possibly content that I did not discover before finalizing the documentation.    BLANCA Steiner.

## 2025-06-08 DIAGNOSIS — E28.2 PCOS (POLYCYSTIC OVARIAN SYNDROME): ICD-10-CM

## 2025-06-08 DIAGNOSIS — Z30.9 ENCOUNTER FOR CONTRACEPTIVE MANAGEMENT, UNSPECIFIED TYPE: ICD-10-CM

## 2025-06-09 ENCOUNTER — OFFICE VISIT (OUTPATIENT)
Dept: URGENT CARE | Facility: CLINIC | Age: 30
End: 2025-06-09
Payer: COMMERCIAL

## 2025-06-09 VITALS — RESPIRATION RATE: 16 BRPM | BODY MASS INDEX: 29.1 KG/M2 | HEIGHT: 68 IN | WEIGHT: 192 LBS

## 2025-06-09 DIAGNOSIS — Z30.41 ENCOUNTER FOR SURVEILLANCE OF CONTRACEPTIVE PILLS: Primary | ICD-10-CM

## 2025-06-09 PROCEDURE — 99213 OFFICE O/P EST LOW 20 MIN: CPT | Performed by: NURSE PRACTITIONER

## 2025-06-09 RX ORDER — TIMOLOL MALEATE 5 MG/ML
1 SOLUTION/ DROPS OPHTHALMIC
Qty: 84 TABLET | Refills: 0 | Status: SHIPPED | OUTPATIENT
Start: 2025-06-09

## 2025-06-09 RX ORDER — LEVONORGESTREL/ETHIN.ESTRADIOL 0.1-0.02MG
1 TABLET ORAL DAILY
Qty: 28 TABLET | Refills: 2 | Status: SHIPPED | OUTPATIENT
Start: 2025-06-09

## 2025-06-09 NOTE — PROGRESS NOTES
"  Subjective:     Fozia Nichole is a 30 y.o. female who presents for Medication Refill (Birth controled )      Presents for medication refill pending an upcoming appointment on 7/31. Has been on the BC x 2 years, and tolerates the medication. LMP is current. Has not had a lapse in the medication. No hx of PE or DVT.         Past Medical History[1]    Past Surgical History[2]    Social History     Socioeconomic History    Marital status: Single     Spouse name: Not on file    Number of children: Not on file    Years of education: Not on file    Highest education level: Not on file   Occupational History    Not on file   Tobacco Use    Smoking status: Never    Smokeless tobacco: Never   Vaping Use    Vaping status: Some Days    Substances: Nicotine    Devices: Disposable   Substance and Sexual Activity    Alcohol use: Yes     Comment: socially     Drug use: Not Currently     Comment: Not currently at this time 7/18/2022.    Sexual activity: Yes     Partners: Male     Birth control/protection: Pill   Other Topics Concern    Not on file   Social History Narrative    Not on file     Social Drivers of Health     Financial Resource Strain: Not on file   Food Insecurity: Not on file   Transportation Needs: Not on file   Physical Activity: Not on file   Stress: Not on file   Social Connections: Not on file   Intimate Partner Violence: Not on file   Housing Stability: Not on file        Family History   Problem Relation Age of Onset    Breast Cancer Mother 52    Cancer Maternal Grandmother         Ovarian CA     Heart Disease Maternal Grandfather     Thyroid Sister         Hyperthyroid     Stroke Neg Hx         Allergies[3]    Review of Systems   All other systems reviewed and are negative.       Objective:   Resp 16   Ht 1.727 m (5' 8\")   Wt 87.1 kg (192 lb)   BMI 29.19 kg/m²     Physical Exam  Vitals reviewed.   Constitutional:       General: She is not in acute distress.     Appearance: She is not " toxic-appearing.   Pulmonary:      Effort: Pulmonary effort is normal.   Neurological:      Mental Status: She is alert and oriented to person, place, and time.         Assessment/Plan:   1. Encounter for surveillance of contraceptive pills  - levonorgestrel-ethinyl estradiol (VIENVA) 0.1-20 MG-MCG per tablet; Take 1 Tablet by mouth every day.  Dispense: 28 Tablet; Refill: 2    Presents for medication refill, and f/u planned for further refills and management.    Differential diagnosis, natural history, supportive care, and indications for immediate follow-up discussed.       [1]   Past Medical History:  Diagnosis Date    Acne vulgaris 1/22/2019    Migraine headache     Migraine without aura 1/21/2014     ICD-10 transition    Mild intermittent asthma 4/13/2021    Ulcer    [2]   Past Surgical History:  Procedure Laterality Date    TONSILLECTOMY      TYMPANOPLASTY     [3] No Known Allergies

## 2025-07-31 ENCOUNTER — APPOINTMENT (OUTPATIENT)
Dept: MEDICAL GROUP | Age: 30
End: 2025-07-31
Payer: COMMERCIAL

## 2025-08-05 ENCOUNTER — APPOINTMENT (OUTPATIENT)
Dept: MEDICAL GROUP | Age: 30
End: 2025-08-05
Payer: COMMERCIAL

## 2025-08-05 PROBLEM — M41.80 DEXTROSCOLIOSIS: Status: ACTIVE | Noted: 2025-08-05

## 2025-08-05 ASSESSMENT — PATIENT HEALTH QUESTIONNAIRE - PHQ9: CLINICAL INTERPRETATION OF PHQ2 SCORE: 0
